# Patient Record
Sex: MALE | Race: WHITE | NOT HISPANIC OR LATINO | Employment: UNEMPLOYED | URBAN - METROPOLITAN AREA
[De-identification: names, ages, dates, MRNs, and addresses within clinical notes are randomized per-mention and may not be internally consistent; named-entity substitution may affect disease eponyms.]

---

## 2019-07-04 ENCOUNTER — HOSPITAL ENCOUNTER (INPATIENT)
Facility: HOSPITAL | Age: 64
LOS: 1 days | Discharge: HOME HEALTH | DRG: 190 | End: 2019-07-05
Attending: EMERGENCY MEDICINE | Admitting: HOSPITALIST
Payer: MEDICARE

## 2019-07-04 DIAGNOSIS — J44.1 CHRONIC OBSTRUCTIVE PULMONARY DISEASE WITH ACUTE EXACERBATION: Primary | ICD-10-CM

## 2019-07-04 DIAGNOSIS — R06.02 SOB (SHORTNESS OF BREATH): ICD-10-CM

## 2019-07-04 PROBLEM — M25.551 PAIN OF RIGHT HIP JOINT: Status: ACTIVE | Noted: 2019-07-04

## 2019-07-04 PROBLEM — J96.21 ACUTE ON CHRONIC RESPIRATORY FAILURE WITH HYPOXIA AND HYPERCAPNIA: Status: ACTIVE | Noted: 2019-07-04

## 2019-07-04 PROBLEM — Z86.79 HISTORY OF BACTERIAL ENDOCARDITIS: Status: ACTIVE | Noted: 2019-07-04

## 2019-07-04 PROBLEM — I10 ESSENTIAL HYPERTENSION: Status: ACTIVE | Noted: 2019-07-04

## 2019-07-04 PROBLEM — E43 SEVERE PROTEIN-CALORIE MALNUTRITION: Status: ACTIVE | Noted: 2019-07-04

## 2019-07-04 PROBLEM — J96.22 ACUTE ON CHRONIC RESPIRATORY FAILURE WITH HYPOXIA AND HYPERCAPNIA: Status: ACTIVE | Noted: 2019-07-04

## 2019-07-04 PROBLEM — D72.829 LEUKOCYTOSIS: Status: ACTIVE | Noted: 2019-07-04

## 2019-07-04 PROBLEM — Z85.46 HISTORY OF PROSTATE CANCER: Status: ACTIVE | Noted: 2019-07-04

## 2019-07-04 LAB
ALBUMIN SERPL BCP-MCNC: 3.8 G/DL (ref 3.5–5.2)
ALLENS TEST: ABNORMAL
ALLENS TEST: ABNORMAL
ALP SERPL-CCNC: 54 U/L (ref 55–135)
ALT SERPL W/O P-5'-P-CCNC: 35 U/L (ref 10–44)
AMPHET+METHAMPHET UR QL: NEGATIVE
ANION GAP SERPL CALC-SCNC: 17 MMOL/L (ref 8–16)
AST SERPL-CCNC: 28 U/L (ref 10–40)
BARBITURATES UR QL SCN>200 NG/ML: NEGATIVE
BASOPHILS # BLD AUTO: 0.07 K/UL (ref 0–0.2)
BASOPHILS NFR BLD: 0.5 % (ref 0–1.9)
BENZODIAZ UR QL SCN>200 NG/ML: NEGATIVE
BILIRUB SERPL-MCNC: 0.4 MG/DL (ref 0.1–1)
BNP SERPL-MCNC: 41 PG/ML (ref 0–99)
BUN SERPL-MCNC: 21 MG/DL (ref 8–23)
BZE UR QL SCN: NORMAL
CALCIUM SERPL-MCNC: 8 MG/DL (ref 8.7–10.5)
CANNABINOIDS UR QL SCN: NEGATIVE
CHLORIDE SERPL-SCNC: 102 MMOL/L (ref 95–110)
CO2 SERPL-SCNC: 27 MMOL/L (ref 23–29)
CREAT SERPL-MCNC: 1.2 MG/DL (ref 0.5–1.4)
CREAT UR-MCNC: 91 MG/DL (ref 23–375)
DELSYS: ABNORMAL
DELSYS: ABNORMAL
DIFFERENTIAL METHOD: ABNORMAL
EOSINOPHIL # BLD AUTO: 0.3 K/UL (ref 0–0.5)
EOSINOPHIL NFR BLD: 2.6 % (ref 0–8)
EP: 5
ERYTHROCYTE [DISTWIDTH] IN BLOOD BY AUTOMATED COUNT: 13.2 % (ref 11.5–14.5)
ERYTHROCYTE [SEDIMENTATION RATE] IN BLOOD BY WESTERGREN METHOD: 20 MM/H
EST. GFR  (AFRICAN AMERICAN): >60 ML/MIN/1.73 M^2
EST. GFR  (NON AFRICAN AMERICAN): >60 ML/MIN/1.73 M^2
ETHANOL UR-MCNC: <10 MG/DL
FIO2: 40
GLUCOSE SERPL-MCNC: 94 MG/DL (ref 70–110)
HCO3 UR-SCNC: 31 MMOL/L (ref 24–28)
HCO3 UR-SCNC: 39 MMOL/L (ref 24–28)
HCT VFR BLD AUTO: 46.5 % (ref 40–54)
HGB BLD-MCNC: 15 G/DL (ref 14–18)
IMM GRANULOCYTES # BLD AUTO: 0.09 K/UL (ref 0–0.04)
IMM GRANULOCYTES NFR BLD AUTO: 0.7 % (ref 0–0.5)
IP: 12
LACTATE SERPL-SCNC: 1.2 MMOL/L (ref 0.5–2.2)
LYMPHOCYTES # BLD AUTO: 3 K/UL (ref 1–4.8)
LYMPHOCYTES NFR BLD: 22.6 % (ref 18–48)
MAGNESIUM SERPL-MCNC: 1.7 MG/DL (ref 1.6–2.6)
MCH RBC QN AUTO: 30.8 PG (ref 27–31)
MCHC RBC AUTO-ENTMCNC: 32.3 G/DL (ref 32–36)
MCV RBC AUTO: 96 FL (ref 82–98)
METHADONE UR QL SCN>300 NG/ML: NEGATIVE
MIN VOL: 9.7
MODE: ABNORMAL
MODE: ABNORMAL
MONOCYTES # BLD AUTO: 1.3 K/UL (ref 0.3–1)
MONOCYTES NFR BLD: 9.7 % (ref 4–15)
NEUTROPHILS # BLD AUTO: 8.5 K/UL (ref 1.8–7.7)
NEUTROPHILS NFR BLD: 63.9 % (ref 38–73)
NRBC BLD-RTO: 0 /100 WBC
OPIATES UR QL SCN: NEGATIVE
PCO2 BLDA: 60.4 MMHG (ref 35–45)
PCO2 BLDA: 87.3 MMHG (ref 35–45)
PCP UR QL SCN>25 NG/ML: NEGATIVE
PH SMN: 7.26 [PH] (ref 7.35–7.45)
PH SMN: 7.32 [PH] (ref 7.35–7.45)
PLATELET # BLD AUTO: 193 K/UL (ref 150–350)
PMV BLD AUTO: 9.9 FL (ref 9.2–12.9)
PO2 BLDA: 167 MMHG (ref 80–100)
PO2 BLDA: 19 MMHG (ref 40–60)
POC BE: 12 MMOL/L
POC BE: 5 MMOL/L
POC SATURATED O2: 20 % (ref 95–100)
POC SATURATED O2: 99 % (ref 95–100)
POC TCO2: 33 MMOL/L (ref 23–27)
POC TCO2: 42 MMOL/L (ref 24–29)
POTASSIUM SERPL-SCNC: 4 MMOL/L (ref 3.5–5.1)
PROCALCITONIN SERPL IA-MCNC: 0.06 NG/ML
PROT SERPL-MCNC: 5.6 G/DL (ref 6–8.4)
RBC # BLD AUTO: 4.87 M/UL (ref 4.6–6.2)
SAMPLE: ABNORMAL
SAMPLE: ABNORMAL
SITE: ABNORMAL
SITE: ABNORMAL
SODIUM SERPL-SCNC: 146 MMOL/L (ref 136–145)
SP02: 99
SPONT RATE: 9
TOXICOLOGY INFORMATION: NORMAL
TROPONIN I SERPL DL<=0.01 NG/ML-MCNC: 0.02 NG/ML (ref 0–0.03)
WBC # BLD AUTO: 13.24 K/UL (ref 3.9–12.7)

## 2019-07-04 PROCEDURE — 80053 COMPREHEN METABOLIC PANEL: CPT

## 2019-07-04 PROCEDURE — 99900035 HC TECH TIME PER 15 MIN (STAT)

## 2019-07-04 PROCEDURE — 83605 ASSAY OF LACTIC ACID: CPT

## 2019-07-04 PROCEDURE — 12000002 HC ACUTE/MED SURGE SEMI-PRIVATE ROOM

## 2019-07-04 PROCEDURE — 99285 EMERGENCY DEPT VISIT HI MDM: CPT | Mod: ,,, | Performed by: EMERGENCY MEDICINE

## 2019-07-04 PROCEDURE — 85025 COMPLETE CBC W/AUTO DIFF WBC: CPT

## 2019-07-04 PROCEDURE — 96360 HYDRATION IV INFUSION INIT: CPT

## 2019-07-04 PROCEDURE — 93005 ELECTROCARDIOGRAM TRACING: CPT

## 2019-07-04 PROCEDURE — 36600 WITHDRAWAL OF ARTERIAL BLOOD: CPT

## 2019-07-04 PROCEDURE — 94660 CPAP INITIATION&MGMT: CPT

## 2019-07-04 PROCEDURE — 27000221 HC OXYGEN, UP TO 24 HOURS

## 2019-07-04 PROCEDURE — 82803 BLOOD GASES ANY COMBINATION: CPT

## 2019-07-04 PROCEDURE — 84484 ASSAY OF TROPONIN QUANT: CPT

## 2019-07-04 PROCEDURE — 25000242 PHARM REV CODE 250 ALT 637 W/ HCPCS: Performed by: EMERGENCY MEDICINE

## 2019-07-04 PROCEDURE — 87040 BLOOD CULTURE FOR BACTERIA: CPT

## 2019-07-04 PROCEDURE — 99223 1ST HOSP IP/OBS HIGH 75: CPT | Mod: ,,, | Performed by: INTERNAL MEDICINE

## 2019-07-04 PROCEDURE — 84145 PROCALCITONIN (PCT): CPT

## 2019-07-04 PROCEDURE — 83735 ASSAY OF MAGNESIUM: CPT

## 2019-07-04 PROCEDURE — 94640 AIRWAY INHALATION TREATMENT: CPT

## 2019-07-04 PROCEDURE — 94761 N-INVAS EAR/PLS OXIMETRY MLT: CPT

## 2019-07-04 PROCEDURE — 99223 PR INITIAL HOSPITAL CARE,LEVL III: ICD-10-PCS | Mod: ,,, | Performed by: INTERNAL MEDICINE

## 2019-07-04 PROCEDURE — 83880 ASSAY OF NATRIURETIC PEPTIDE: CPT

## 2019-07-04 PROCEDURE — 93010 ELECTROCARDIOGRAM REPORT: CPT | Mod: ,,, | Performed by: INTERNAL MEDICINE

## 2019-07-04 PROCEDURE — 80307 DRUG TEST PRSMV CHEM ANLYZR: CPT

## 2019-07-04 PROCEDURE — 11000001 HC ACUTE MED/SURG PRIVATE ROOM

## 2019-07-04 PROCEDURE — 99285 PR EMERGENCY DEPT VISIT,LEVEL V: ICD-10-PCS | Mod: ,,, | Performed by: EMERGENCY MEDICINE

## 2019-07-04 PROCEDURE — 96361 HYDRATE IV INFUSION ADD-ON: CPT

## 2019-07-04 PROCEDURE — 99285 EMERGENCY DEPT VISIT HI MDM: CPT | Mod: 25

## 2019-07-04 PROCEDURE — 93010 EKG 12-LEAD: ICD-10-PCS | Mod: ,,, | Performed by: INTERNAL MEDICINE

## 2019-07-04 PROCEDURE — 27000190 HC CPAP FULL FACE MASK W/VALVE

## 2019-07-04 PROCEDURE — 25000003 PHARM REV CODE 250: Performed by: STUDENT IN AN ORGANIZED HEALTH CARE EDUCATION/TRAINING PROGRAM

## 2019-07-04 RX ORDER — ENOXAPARIN SODIUM 100 MG/ML
40 INJECTION SUBCUTANEOUS EVERY 24 HOURS
Status: DISCONTINUED | OUTPATIENT
Start: 2019-07-04 | End: 2019-07-05 | Stop reason: HOSPADM

## 2019-07-04 RX ORDER — ALBUTEROL SULFATE 2.5 MG/.5ML
5 SOLUTION RESPIRATORY (INHALATION)
Status: COMPLETED | OUTPATIENT
Start: 2019-07-04 | End: 2019-07-04

## 2019-07-04 RX ORDER — ONDANSETRON 8 MG/1
8 TABLET, ORALLY DISINTEGRATING ORAL EVERY 8 HOURS PRN
Status: DISCONTINUED | OUTPATIENT
Start: 2019-07-04 | End: 2019-07-05 | Stop reason: HOSPADM

## 2019-07-04 RX ORDER — PREDNISONE 20 MG/1
40 TABLET ORAL DAILY
Status: DISCONTINUED | OUTPATIENT
Start: 2019-07-05 | End: 2019-07-04

## 2019-07-04 RX ORDER — PREDNISONE 20 MG/1
40 TABLET ORAL DAILY
Status: DISCONTINUED | OUTPATIENT
Start: 2019-07-05 | End: 2019-07-05 | Stop reason: HOSPADM

## 2019-07-04 RX ORDER — AZITHROMYCIN 250 MG/1
500 TABLET, FILM COATED ORAL ONCE
Status: DISCONTINUED | OUTPATIENT
Start: 2019-07-04 | End: 2019-07-04

## 2019-07-04 RX ORDER — TAMSULOSIN HYDROCHLORIDE 0.4 MG/1
0.4 CAPSULE ORAL DAILY
Status: DISCONTINUED | OUTPATIENT
Start: 2019-07-05 | End: 2019-07-05 | Stop reason: HOSPADM

## 2019-07-04 RX ORDER — AZITHROMYCIN 250 MG/1
500 TABLET, FILM COATED ORAL ONCE
Status: COMPLETED | OUTPATIENT
Start: 2019-07-04 | End: 2019-07-05

## 2019-07-04 RX ORDER — IPRATROPIUM BROMIDE AND ALBUTEROL SULFATE 2.5; .5 MG/3ML; MG/3ML
3 SOLUTION RESPIRATORY (INHALATION)
Status: DISCONTINUED | OUTPATIENT
Start: 2019-07-05 | End: 2019-07-05 | Stop reason: HOSPADM

## 2019-07-04 RX ORDER — SODIUM CHLORIDE 0.9 % (FLUSH) 0.9 %
10 SYRINGE (ML) INJECTION
Status: DISCONTINUED | OUTPATIENT
Start: 2019-07-04 | End: 2019-07-05 | Stop reason: HOSPADM

## 2019-07-04 RX ORDER — AZITHROMYCIN 250 MG/1
250 TABLET, FILM COATED ORAL DAILY
Status: DISCONTINUED | OUTPATIENT
Start: 2019-07-05 | End: 2019-07-04

## 2019-07-04 RX ORDER — IBUPROFEN 200 MG
1 TABLET ORAL DAILY
Status: DISCONTINUED | OUTPATIENT
Start: 2019-07-05 | End: 2019-07-05 | Stop reason: HOSPADM

## 2019-07-04 RX ORDER — IPRATROPIUM BROMIDE AND ALBUTEROL SULFATE 2.5; .5 MG/3ML; MG/3ML
3 SOLUTION RESPIRATORY (INHALATION)
Status: COMPLETED | OUTPATIENT
Start: 2019-07-04 | End: 2019-07-04

## 2019-07-04 RX ORDER — ALBUTEROL SULFATE 90 UG/1
2 AEROSOL, METERED RESPIRATORY (INHALATION) EVERY 6 HOURS PRN
Status: DISCONTINUED | OUTPATIENT
Start: 2019-07-04 | End: 2019-07-05 | Stop reason: HOSPADM

## 2019-07-04 RX ORDER — ACETAMINOPHEN 325 MG/1
650 TABLET ORAL EVERY 6 HOURS PRN
Status: DISCONTINUED | OUTPATIENT
Start: 2019-07-04 | End: 2019-07-05 | Stop reason: HOSPADM

## 2019-07-04 RX ORDER — AZITHROMYCIN 250 MG/1
250 TABLET, FILM COATED ORAL DAILY
Status: DISCONTINUED | OUTPATIENT
Start: 2019-07-05 | End: 2019-07-05 | Stop reason: HOSPADM

## 2019-07-04 RX ORDER — FLUTICASONE FUROATE AND VILANTEROL 100; 25 UG/1; UG/1
1 POWDER RESPIRATORY (INHALATION) DAILY
Status: DISCONTINUED | OUTPATIENT
Start: 2019-07-05 | End: 2019-07-05 | Stop reason: HOSPADM

## 2019-07-04 RX ADMIN — IPRATROPIUM BROMIDE AND ALBUTEROL SULFATE 3 ML: .5; 3 SOLUTION RESPIRATORY (INHALATION) at 07:07

## 2019-07-04 RX ADMIN — ALBUTEROL SULFATE 5 MG: 2.5 SOLUTION RESPIRATORY (INHALATION) at 07:07

## 2019-07-04 RX ADMIN — SODIUM CHLORIDE 1000 ML: 0.9 INJECTION, SOLUTION INTRAVENOUS at 07:07

## 2019-07-05 VITALS
HEIGHT: 61 IN | DIASTOLIC BLOOD PRESSURE: 69 MMHG | OXYGEN SATURATION: 100 % | TEMPERATURE: 96 F | WEIGHT: 107.13 LBS | RESPIRATION RATE: 18 BRPM | BODY MASS INDEX: 20.22 KG/M2 | SYSTOLIC BLOOD PRESSURE: 93 MMHG | HEART RATE: 81 BPM

## 2019-07-05 LAB
ANION GAP SERPL CALC-SCNC: 12 MMOL/L (ref 8–16)
ANISOCYTOSIS BLD QL SMEAR: SLIGHT
BASOPHILS # BLD AUTO: 0.02 K/UL (ref 0–0.2)
BASOPHILS NFR BLD: 0.2 % (ref 0–1.9)
BUN SERPL-MCNC: 24 MG/DL (ref 8–23)
CALCIUM SERPL-MCNC: 9.1 MG/DL (ref 8.7–10.5)
CHLORIDE SERPL-SCNC: 103 MMOL/L (ref 95–110)
CO2 SERPL-SCNC: 21 MMOL/L (ref 23–29)
COMPLEXED PSA SERPL-MCNC: 0.54 NG/ML (ref 0–4)
CREAT SERPL-MCNC: 0.8 MG/DL (ref 0.5–1.4)
DIFFERENTIAL METHOD: ABNORMAL
EOSINOPHIL # BLD AUTO: 0 K/UL (ref 0–0.5)
EOSINOPHIL NFR BLD: 0.1 % (ref 0–8)
ERYTHROCYTE [DISTWIDTH] IN BLOOD BY AUTOMATED COUNT: 13.2 % (ref 11.5–14.5)
EST. GFR  (AFRICAN AMERICAN): >60 ML/MIN/1.73 M^2
EST. GFR  (NON AFRICAN AMERICAN): >60 ML/MIN/1.73 M^2
GLUCOSE SERPL-MCNC: 171 MG/DL (ref 70–110)
HCT VFR BLD AUTO: 41.8 % (ref 40–54)
HGB BLD-MCNC: 13.1 G/DL (ref 14–18)
IMM GRANULOCYTES # BLD AUTO: 0.07 K/UL (ref 0–0.04)
IMM GRANULOCYTES NFR BLD AUTO: 0.7 % (ref 0–0.5)
LYMPHOCYTES # BLD AUTO: 0.7 K/UL (ref 1–4.8)
LYMPHOCYTES NFR BLD: 6.8 % (ref 18–48)
MAGNESIUM SERPL-MCNC: 1.9 MG/DL (ref 1.6–2.6)
MCH RBC QN AUTO: 30.8 PG (ref 27–31)
MCHC RBC AUTO-ENTMCNC: 31.3 G/DL (ref 32–36)
MCV RBC AUTO: 98 FL (ref 82–98)
MONOCYTES # BLD AUTO: 0.1 K/UL (ref 0.3–1)
MONOCYTES NFR BLD: 0.9 % (ref 4–15)
NEUTROPHILS # BLD AUTO: 8.9 K/UL (ref 1.8–7.7)
NEUTROPHILS NFR BLD: 91.3 % (ref 38–73)
NRBC BLD-RTO: 0 /100 WBC
PLATELET # BLD AUTO: 126 K/UL (ref 150–350)
PLATELET BLD QL SMEAR: ABNORMAL
PMV BLD AUTO: 11.9 FL (ref 9.2–12.9)
POTASSIUM SERPL-SCNC: 4.9 MMOL/L (ref 3.5–5.1)
RBC # BLD AUTO: 4.26 M/UL (ref 4.6–6.2)
SODIUM SERPL-SCNC: 136 MMOL/L (ref 136–145)
WBC # BLD AUTO: 9.8 K/UL (ref 3.9–12.7)

## 2019-07-05 PROCEDURE — 27000221 HC OXYGEN, UP TO 24 HOURS

## 2019-07-05 PROCEDURE — 84153 ASSAY OF PSA TOTAL: CPT

## 2019-07-05 PROCEDURE — 83735 ASSAY OF MAGNESIUM: CPT

## 2019-07-05 PROCEDURE — 63600175 PHARM REV CODE 636 W HCPCS: Performed by: INTERNAL MEDICINE

## 2019-07-05 PROCEDURE — 25000242 PHARM REV CODE 250 ALT 637 W/ HCPCS: Performed by: INTERNAL MEDICINE

## 2019-07-05 PROCEDURE — 99239 HOSP IP/OBS DSCHRG MGMT >30: CPT | Mod: ,,, | Performed by: HOSPITALIST

## 2019-07-05 PROCEDURE — 87070 CULTURE OTHR SPECIMN AEROBIC: CPT

## 2019-07-05 PROCEDURE — 80048 BASIC METABOLIC PNL TOTAL CA: CPT

## 2019-07-05 PROCEDURE — 25000003 PHARM REV CODE 250: Performed by: INTERNAL MEDICINE

## 2019-07-05 PROCEDURE — 94640 AIRWAY INHALATION TREATMENT: CPT

## 2019-07-05 PROCEDURE — 99900035 HC TECH TIME PER 15 MIN (STAT)

## 2019-07-05 PROCEDURE — S4991 NICOTINE PATCH NONLEGEND: HCPCS | Performed by: INTERNAL MEDICINE

## 2019-07-05 PROCEDURE — 36415 COLL VENOUS BLD VENIPUNCTURE: CPT

## 2019-07-05 PROCEDURE — 94761 N-INVAS EAR/PLS OXIMETRY MLT: CPT

## 2019-07-05 PROCEDURE — 85025 COMPLETE CBC W/AUTO DIFF WBC: CPT

## 2019-07-05 PROCEDURE — 87205 SMEAR GRAM STAIN: CPT

## 2019-07-05 PROCEDURE — 99239 PR HOSPITAL DISCHARGE DAY,>30 MIN: ICD-10-PCS | Mod: ,,, | Performed by: HOSPITALIST

## 2019-07-05 RX ORDER — AZITHROMYCIN 250 MG/1
250 TABLET, FILM COATED ORAL DAILY
Qty: 4 TABLET | Refills: 0 | Status: ON HOLD | OUTPATIENT
Start: 2019-07-06 | End: 2022-01-31 | Stop reason: HOSPADM

## 2019-07-05 RX ORDER — IBUPROFEN 200 MG
1 TABLET ORAL DAILY
Refills: 0 | COMMUNITY
Start: 2019-07-06 | End: 2019-07-05

## 2019-07-05 RX ORDER — PREDNISONE 5 MG/1
40 TABLET ORAL DAILY
Qty: 32 TABLET | Refills: 0 | Status: SHIPPED | OUTPATIENT
Start: 2019-07-06 | End: 2019-07-05

## 2019-07-05 RX ORDER — FLUTICASONE FUROATE AND VILANTEROL 100; 25 UG/1; UG/1
1 POWDER RESPIRATORY (INHALATION) DAILY
Qty: 60 EACH | Refills: 11 | Status: SHIPPED | OUTPATIENT
Start: 2019-07-06

## 2019-07-05 RX ORDER — TIOTROPIUM BROMIDE 18 UG/1
18 CAPSULE ORAL; RESPIRATORY (INHALATION) DAILY
Qty: 30 CAPSULE | Refills: 6 | Status: SHIPPED | OUTPATIENT
Start: 2019-07-05 | End: 2019-08-04

## 2019-07-05 RX ORDER — IBUPROFEN 200 MG
1 TABLET ORAL DAILY
Qty: 28 PATCH | Refills: 2 | Status: SHIPPED | OUTPATIENT
Start: 2019-07-06

## 2019-07-05 RX ORDER — PREDNISONE 5 MG/1
40 TABLET ORAL DAILY
Qty: 32 TABLET | Refills: 0 | Status: SHIPPED | OUTPATIENT
Start: 2019-07-06 | End: 2019-07-10

## 2019-07-05 RX ORDER — FLUTICASONE FUROATE AND VILANTEROL 100; 25 UG/1; UG/1
1 POWDER RESPIRATORY (INHALATION) DAILY
Qty: 60 EACH | Refills: 11 | Status: SHIPPED | OUTPATIENT
Start: 2019-07-06 | End: 2019-07-05

## 2019-07-05 RX ORDER — TIOTROPIUM BROMIDE 18 UG/1
18 CAPSULE ORAL; RESPIRATORY (INHALATION) DAILY
Qty: 30 CAPSULE | Refills: 6 | Status: SHIPPED | OUTPATIENT
Start: 2019-07-05 | End: 2019-07-05 | Stop reason: SDUPTHER

## 2019-07-05 RX ORDER — AZITHROMYCIN 250 MG/1
250 TABLET, FILM COATED ORAL DAILY
Qty: 4 TABLET | Refills: 0 | Status: SHIPPED | OUTPATIENT
Start: 2019-07-06 | End: 2019-07-05

## 2019-07-05 RX ADMIN — IPRATROPIUM BROMIDE AND ALBUTEROL SULFATE 3 ML: .5; 3 SOLUTION RESPIRATORY (INHALATION) at 01:07

## 2019-07-05 RX ADMIN — Medication 1 PATCH: at 10:07

## 2019-07-05 RX ADMIN — ALBUTEROL SULFATE 2 PUFF: 90 AEROSOL, METERED RESPIRATORY (INHALATION) at 10:07

## 2019-07-05 RX ADMIN — AZITHROMYCIN 500 MG: 250 TABLET, FILM COATED ORAL at 01:07

## 2019-07-05 RX ADMIN — TAMSULOSIN HYDROCHLORIDE 0.4 MG: 0.4 CAPSULE ORAL at 10:07

## 2019-07-05 RX ADMIN — PREDNISONE 40 MG: 20 TABLET ORAL at 10:07

## 2019-07-05 RX ADMIN — AZITHROMYCIN 250 MG: 250 TABLET, FILM COATED ORAL at 10:07

## 2019-07-05 RX ADMIN — ENOXAPARIN SODIUM 40 MG: 100 INJECTION SUBCUTANEOUS at 01:07
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
## 2019-07-05 NOTE — HOSPITAL COURSE
"Patient was admitted on IM R for further management of severe COPD. Upon evaluation on HD 1, patient stated "I want to go home now. I have things to do, that can't wait. I feel much better, I need to go home."    Patient's respiratory status on examination appeared to be his baseline. He was oxygenating in the high 90s on his baseline home supplemental O2 requirement. His lungs were clear to auscultation without wheezing or rales noted. He was speaking in full sentences and joking about having to feed his fat, black cat. We discussed my concerns that his severe COPD was approaching end stage, particularly after he described his severe limitations at home ("I can't shower every day because of the shortness of breath", "my coffee is cold by the time I'm able to sit down with it to enjoy it"), but he made it clear that he wished to still manage his affairs on his own at home. I discussed alf placement with him and he stated that he had been considering it, but "I'm not ready yet". When he insisted on discharge today, I expressed concern that he would likely be looking forward to multiple readmissions for further exacerbations and he stated, "no doubt". However, given his clinical stability and return to his functional baseline, as well as having capacity to make his own decisions, he was discharged upon request, with recommendations to follow up with his PCP within 1 week. His Rx for Breo, Combivent and Spiriva along with a steroid taper and total 5 day course of abx were sent to [facility] pharmacy for bedside delivery. Home health orders were placed and SW/CM were notified of patient's need for oxygen upon transfer home.   "

## 2019-07-05 NOTE — PLAN OF CARE
"[facility]    HOME HEALTH ORDERS  FACE TO FACE ENCOUNTER    Patient Name: Dashawn Zuleta  YOB: 1955    PCP: Klaus Childress MD   PCP Address: [address] [place] [address]  PCP Phone Number: [phone]  PCP Fax: [phone]    Encounter Date: 07/05/2019    Admit to Home Health    Diagnoses:  Active Hospital Problems    Diagnosis  POA    *Acute on chronic respiratory failure with hypoxia and hypercapnia [J96.21, J96.22]  Yes    Chronic obstructive pulmonary disease with acute exacerbation [J44.1]  Yes    Essential hypertension [I10]  Yes    History of bacterial endocarditis [Z86.79]  Not Applicable    Leukocytosis [D72.829]  Yes    Pain of right hip joint [M25.551]  Yes    Severe protein-calorie malnutrition [E43]  Yes    History of prostate cancer [Z85.46]  Not Applicable    Hepatitis C, chronic [B18.2]  Yes     Chronic    Tobacco abuse [Z72.0]  Yes    History of intravenous drug abuse [Z87.898]  Yes    Benign prostatic hyperplasia without lower urinary tract symptoms [N40.0]  Yes      Resolved Hospital Problems   No resolved problems to display.       No future appointments.        I have seen and examined this patient face to face today. My clinical findings that support the need for the home health skilled services and home bound status are the following:  Weakness/numbness causing balance and gait disturbance due to COPD Exacerbation making it taxing to leave home.    Allergies:  Review of patient's allergies indicates:   Allergen Reactions    Percocet [oxycodone-acetaminophen] Other (See Comments)     Pt states this drug gives him "bad dreams", he does not want this prescribed       Diet: regular diet    Activities: activity as tolerated    Nursing:   SN to complete comprehensive assessment including routine vital signs. Instruct on disease process and s/s of complications to report to MD. Review/verify medication list sent home with the patient at " time of discharge  and instruct patient/caregiver as needed. Frequency may be adjusted depending on start of care date.    Notify MD if SBP > 160 or < 90; DBP > 90 or < 50; HR > 120 or < 50; Temp > 101; Other:         CONSULTS:    Physical Therapy to evaluate and treat. Evaluate for home safety and equipment needs; Establish/upgrade home exercise program. Perform / instruct on therapeutic exercises, gait training, transfer training, and Range of Motion.  Occupational Therapy to evaluate and treat. Evaluate home environment for safety and equipment needs. Perform/Instruct on transfers, ADL training, ROM, and therapeutic exercises.   to evaluate for community resources/long-range planning.  Aide to provide assistance with personal care, ADLs, and vital signs.    MISCELLANEOUS CARE:  COPD: Teach patient MDI/HFA usage and guidelines  Please provide smoking education, cessation, and nicotine replacement options if patient is a current smoker or if anyone in the household is a smoker  Please ensure that patient has a pulse oximeter and is educated on his normal oxygen saturations: 88-92%  Please ensure patient has a functioning nebulizer and provide education on its usage.  If patient has increased cough or symptoms, please initiate COPD protocol including  schedule appointment with PCP or pulmonologist within 24 hours    Continue Home Oxygen 4L to maintain O2 sat >88%    WOUND CARE ORDERS  n/a      Medications: Review discharge medications with patient and family and provide education.      Current Discharge Medication List      START taking these medications    Details   azithromycin (Z-AIDAN) 250 MG tablet Take 1 tablet (250 mg total) by mouth once daily.  Qty: 4 tablet, Refills: 0      fluticasone furoate-vilanterol (BREO) 100-25 mcg/dose diskus inhaler Inhale 1 puff into the lungs once daily. Controller  Qty: 1 each, Refills: 11      nicotine (NICODERM CQ) 14 mg/24 hr Place 1 patch onto the skin once  daily.  Refills: 0      predniSONE (DELTASONE) 20 MG tablet Take 2 tablets (40 mg total) by mouth once daily. for 4 days  Qty: 8 tablet, Refills: 0         CONTINUE these medications which have NOT CHANGED    Details   albuterol (PROAIR HFA) 90 mcg/actuation HFAA Inhale 2 puffs into the lungs every 6 (six) hours as needed.      albuterol-ipratropium  mcg (COMBIVENT)  mcg/actuation inhaler Inhale 2 puffs into the lungs every 4 (four) hours as needed for Wheezing or Shortness of Breath.  Qty: 14.7 g, Refills: 1      tamsulosin (FLOMAX) 0.4 mg Cp24 Take 1 capsule (0.4 mg total) by mouth 2 (two) times daily with meals.  Qty: 60 capsule, Refills: 11         STOP taking these medications       oxybutynin (DITROPAN) 5 MG Tab Comments:   Reason for Stopping:               I certify that this patient is confined to his home and needs intermittent skilled nursing care, physical therapy and occupational therapy.

## 2019-07-05 NOTE — NURSING
Pt arrived to room 660 via stretcher. AAO X4. Denies pain and SOB at rest. C/O SOB upon exertion. Urinal provided. Request Condom cath, ordered. VSS. Resp remain elevated. Placed on O2 @ 4 L/ NC. Safety in place. Advised to call for assistance before getting out of bed, verbalized understanding. Will monitor.

## 2019-07-05 NOTE — HPI
"Per admitting provider:     Mr. Zuleta is a 64-year-old man with prostate cancer s/p laser therapy, chronic HCV, HTN, hx IVDU c/b IE further complicated by MR, and chronic hypoxic and hypercapnic respiratory failure on 4L home O2 secondary to advanced COPD who presents with shortness of breath.     He is somewhat of a poor historian due to poor insight into his medical conditions, but he describes a recent 4 day history at [facility] for COPD vs. pneumonia. They "did nothing," and he was discharged on his usual COPD medications, almost immediately becoming dyspneic, worst in the last two days. His condition has worsened to the extent that he can barely move without severe dyspnea, which also limits his ability to eat. No chest pain, palpitations, syncope, or edema. He has a chronic cough productive of yellow phlegm, worst after albuterol use, which is unchanged. He has been trying to cut down on his smoking, now down to about 3 cigarettes per day, but smoked more yesterday, feeling worse after each cigarette. His only other complaints are a "mass" in his right leg (points to greater trochanter process) on recent nasal congestion. He is unsure of the details regarding his home COPD regimen, but has been using his albuterol neb at least 4 times daily at home and is out of his fluticasone inhaler.     On arrival to the ED he was in respiratory distress, breathing > 40 times a minute on 4L with . He was stabilized with duonebs, steroids, and an hour of BiPAP. His work of breathing improved significantly, along with respiratory acidosis from 7.258 -> 7.318. Admission requested for COPD exacerbation  "
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
## 2019-07-05 NOTE — PLAN OF CARE
Problem: Adult Inpatient Plan of Care  Goal: Plan of Care Review  Outcome: Ongoing (interventions implemented as appropriate)     07/05/19 0510   Plan of Care Review   Plan of Care Reviewed With patient     Pt remain free from fall and injuries. Tolerate meds well. VSS. NO SOB noted. Denies pain and discomfort. Placed on condom cath per request. Advised to call for assist as needed and wait for assist to arrive, verbalized understanding. Safety maintained. Will monitor.

## 2019-07-05 NOTE — ED NOTES
Telemetry Verification   Patient placed on Telemetry Box  Verified with War Room  Box # [id number]   Monitor Tech cristian   Rate 88   Rhythm NSR

## 2019-07-05 NOTE — CONSULTS
"  [facility]  Adult Nutrition  Consult Note    SUMMARY     Recommendations    Recommendation:   1. Continue low Na diet   2. Encourage good PO intake and diet compliance   RD to monitor and follow up     Goals: pt to meet >75% of EEN/EPN while admitted  Nutrition Goal Status: new  Communication of RD Recs: other (comment)(POC)    Reason for Assessment    Reason For Assessment: consult  Diagnosis: (SOB)  Relevant Medical History: SOB; COPD: HepC; PVD; prostate cancer  Interdisciplinary Rounds: did not attend  General Information Comments: Pt with % of meals today. States at home he is unable to cook for himself due to SOB, discussed easy meals and adequate intake with pt. Pt with poor understanding of Low Na diet. Wt loss reported over past year of ~20 lbs. Reported  lbs. Pt with severe malnutrition at this time. Encouraging intake and diet compliance.   Nutrition Discharge Planning: d/c on low Na diet    Nutrition Risk Screen    Nutrition Risk Screen: no indicators present    Nutrition/Diet History    Patient Reported Diet/Restrictions/Preferences: general  Spiritual, Cultural Beliefs, Methodist Practices, Values that Affect Care: no  Food Allergies: NKFA  Factors Affecting Nutritional Intake: None identified at this time    Anthropometrics    Temp: 96.3 °F (35.7 °C)  Height: 5' 0.5" (153.7 cm)  Height (inches): 60.5 in  Weight Method: Bed Scale  Weight: 48.6 kg (107 lb 2.3 oz)  Weight (lb): 107.14 lb  Ideal Body Weight (IBW), Male: 109 lb  % Ideal Body Weight, Male (lb): 93.04 lb  BMI (Calculated): 19.5  BMI Grade: 18.5-24.9 - normal  Weight Loss: unintentional  Usual Body Weight (UBW), k.82 kg  % Usual Body Weight: 85.71       Lab/Procedures/Meds    Pertinent Labs Reviewed: reviewed  Pertinent Labs Comments: BUN 24; Glucose 171  Pertinent Medications Reviewed: reviewed  Pertinent Medications Comments: prednisone; tamsulosin; enoxaparin     Estimated/Assessed Needs    Weight " Used For Calorie Calculations: 48.6 kg (107 lb 2.3 oz)  Energy Calorie Requirements (kcal): 2847-8856 kcal/day   Energy Need Method: Kcal/kg  Protein Requirements: 48-63 g/day (1.0-1.3 g/kg)   Weight Used For Protein Calculations: 48.6 kg (107 lb 2.3 oz)  Fluid Requirements (mL): 1 mL/kcal or per MD  RDA Method (mL): 1215    Nutrition Prescription Ordered    Current Diet Order: Low Na diet    Evaluation of Received Nutrient/Fluid Intake    Energy Calories Required: meeting needs  Protein Required: meeting needs  Fluid Required: meeting needs  Tolerance: tolerating  % Intake of Estimated Energy Needs: 75 - 100 %  % Meal Intake: 75 - 100 %    Nutrition Risk    Level of Risk/Frequency of Follow-up: low(1x/week)     Assessment and Plan    Severe malnutrition in the context of Social/Environmental Circumstances    Related to (etiology):  Decreased intake 2/2 SOB    Signs and Symptoms (as evidenced by):  Energy Intake: <75% of estimated energy requirement for 1 month   Body Fat Depletion: moderate depletion of triceps   Muscle Mass Depletion: moderate and severe depletion of temples, clavicle region, scapular region and interosseous muscle   Weight Loss: 14% x 1 year      Interventions  (treatment strategy):  Collaboration with other providers     Nutrition Diagnosis Status:  New    Monitor and Evaluation    Food and Nutrient Intake: energy intake, food and beverage intake  Food and Nutrient Adminstration: diet order  Knowledge/Beliefs/Attitudes: food and nutrition knowledge/skill  Anthropometric Measurements: weight, weight change  Biochemical Data, Medical Tests and Procedures: electrolyte and renal panel, lipid profile, gastrointestinal profile, glucose/endocrine profile, inflammatory profile  Nutrition-Focused Physical Findings: overall appearance     Malnutrition Assessment    Orbital Region (Subcutaneous Fat Loss): moderate depletion  Upper Arm Region (Subcutaneous Fat Loss): moderate depletion   Restoration Region  (Muscle Loss): moderate depletion  Clavicle Bone Region (Muscle Loss): severe depletion  Clavicle and Acromion Bone Region (Muscle Loss): severe depletion  Scapular Bone Region (Muscle Loss): moderate depletion  Dorsal Hand (Muscle Loss): moderate depletion   Edema (Fluid Accumulation): 0-->no edema present   Subcutaneous Fat Loss (Final Summary): moderate protein-calorie malnutrition  Muscle Loss Evaluation (Final Summary): moderate protein-calorie malnutrition      Nutrition Follow-Up    RD Follow-up?: Yes

## 2019-07-05 NOTE — DISCHARGE SUMMARY
"[facility] Hospital Medicine  Discharge Summary      Patient Name: Dashawn Zuleta  MRN: [id number]  Admission Date: 7/4/2019  Hospital Length of Stay: 1 days  Discharge Date and Time: No discharge date for patient encounter.  Attending Physician: Sue Gonzales*   Discharging Provider: Sue Gonzales MD  Primary Care Provider: Klaus Childress MD  Hospital Medicine Team: [facility] HOSP MED  Sue Gonzales MD    HPI:   Per admitting provider:     Mr. Zuleta is a 64-year-old man with prostate cancer s/p laser therapy, chronic HCV, HTN, hx IVDU c/b IE further complicated by MR, and chronic hypoxic and hypercapnic respiratory failure on 4L home O2 secondary to advanced COPD who presents with shortness of breath.     He is somewhat of a poor historian due to poor insight into his medical conditions, but he describes a recent 4 day history at [facility] for COPD vs. pneumonia. They "did nothing," and he was discharged on his usual COPD medications, almost immediately becoming dyspneic, worst in the last two days. His condition has worsened to the extent that he can barely move without severe dyspnea, which also limits his ability to eat. No chest pain, palpitations, syncope, or edema. He has a chronic cough productive of yellow phlegm, worst after albuterol use, which is unchanged. He has been trying to cut down on his smoking, now down to about 3 cigarettes per day, but smoked more yesterday, feeling worse after each cigarette. His only other complaints are a "mass" in his right leg (points to greater trochanter process) on recent nasal congestion. He is unsure of the details regarding his home COPD regimen, but has been using his albuterol neb at least 4 times daily at home and is out of his fluticasone inhaler.     On arrival to the ED he was in respiratory distress, breathing > 40 times a minute on 4L with . He was stabilized with duonebs, steroids, and an hour of BiPAP. His " "work of breathing improved significantly, along with respiratory acidosis from 7.258 -> 7.318. Admission requested for COPD exacerbation    * No surgery found *      Hospital Course:   Patient was admitted on IM R for further management of severe COPD. Upon evaluation on HD 1, patient stated "I want to go home now. I have things to do, that can't wait. I feel much better, I need to go home."    Patient's respiratory status on examination appeared to be his baseline. He was oxygenating in the high 90s on his baseline home supplemental O2 requirement. His lungs were clear to auscultation without wheezing or rales noted. He was speaking in full sentences and joking about having to feed his fat, black cat. We discussed my concerns that his severe COPD was approaching end stage, particularly after he described his severe limitations at home ("I can't shower every day because of the shortness of breath", "my coffee is cold by the time I'm able to sit down with it to enjoy it"), but he made it clear that he wished to still manage his affairs on his own at home. I discussed [facility] placement with him and he stated that he had been considering it, but "I'm not ready yet". When he insisted on discharge today, I expressed concern that he would likely be looking forward to multiple readmissions for further exacerbations and he stated, "no doubt". However, given his clinical stability and return to his functional baseline, as well as having capacity to make his own decisions, he was discharged upon request, with recommendations to follow up with his PCP within 1 week. His Rx for Breo, Combivent and Spiriva along with a steroid taper and total 5 day course of abx were sent to [facility] pharmacy for bedside delivery. Home health orders were placed and SW/CM were notified of patient's need for oxygen upon transfer home.      Consults:   Consults (From admission, onward)        Status Ordering Provider     Inpatient consult to " Registered Dietitian/Nutritionist  Once     Provider:  (Not yet assigned)    Acknowledged ISABEL VANEGAS          No new Assessment & Plan notes have been filed under this hospital service since the last note was generated.  Service: Hospital Medicine    Final Active Diagnoses:    Diagnosis Date Noted POA    PRINCIPAL PROBLEM:  Acute on chronic respiratory failure with hypoxia and hypercapnia [J96.21, J96.22] 07/04/2019 Yes    Chronic obstructive pulmonary disease with acute exacerbation [J44.1] 07/04/2019 Yes    Essential hypertension [I10] 07/04/2019 Yes    History of bacterial endocarditis [Z86.79] 07/04/2019 Not Applicable    Leukocytosis [D72.829] 07/04/2019 Yes    Pain of right hip joint [M25.551] 07/04/2019 Yes    Severe protein-calorie malnutrition [E43] 07/04/2019 Yes    History of prostate cancer [Z85.46] 07/04/2019 Not Applicable    Hepatitis C, chronic [B18.2] 08/09/2013 Yes     Chronic    Tobacco abuse [Z72.0] 08/09/2013 Yes    History of intravenous drug abuse [Z87.898]  Yes    Benign prostatic hyperplasia without lower urinary tract symptoms [N40.0] 10/11/2012 Yes      Problems Resolved During this Admission:       Discharged Condition: stable    Disposition: Home or Self Care    Follow Up:    Patient Instructions:   No discharge procedures on file.    Significant Diagnostic Studies: Labs:   CMP   Recent Labs   Lab 07/04/19 1942 07/05/19 0431   * 136   K 4.0 4.9    103   CO2 27 21*   GLU 94 171*   BUN 21 24*   CREATININE 1.2 0.8   CALCIUM 8.0* 9.1   PROT 5.6*  --    ALBUMIN 3.8  --    BILITOT 0.4  --    ALKPHOS 54*  --    AST 28  --    ALT 35  --    ANIONGAP 17* 12   ESTGFRAFRICA >60.0 >60.0   EGFRNONAA >60.0 >60.0    and CBC   Recent Labs   Lab 07/04/19 1942 07/05/19 0431   WBC 13.24* 9.80   HGB 15.0 13.1*   HCT 46.5 41.8    126*       Pending Diagnostic Studies:     None         Medications:  Reconciled Home Medications:      Medication List      START taking  these medications    azithromycin 250 MG tablet  Commonly known as:  Z-AIDAN  Take 1 tablet (250 mg total) by mouth once daily.  Start taking on:  7/6/2019     fluticasone furoate-vilanterol 100-25 mcg/dose diskus inhaler  Commonly known as:  BREO  Inhale 1 puff into the lungs once daily. Controller  Start taking on:  7/6/2019     ipratropium-albuterol  mcg/actuation inhaler  Commonly known as:  COMBIVENT  Inhale 1 puff into the lungs every 6 (six) hours as needed for Wheezing. Rescue  Replaces:  albuterol-ipratropium  mcg  mcg/actuation inhaler     nicotine 14 mg/24 hr  Commonly known as:  NICODERM CQ  Place 1 patch onto the skin once daily.  Start taking on:  7/6/2019     predniSONE 20 MG tablet  Commonly known as:  DELTASONE  Take 2 tablets (40 mg total) by mouth once daily. for 4 days  Start taking on:  7/6/2019     tiotropium 18 mcg inhalation capsule  Commonly known as:  SPIRIVA  Inhale 1 capsule (18 mcg total) into the lungs once daily.        CONTINUE taking these medications    PROAIR HFA 90 mcg/actuation inhaler  Generic drug:  albuterol  Inhale 2 puffs into the lungs every 6 (six) hours as needed.     tamsulosin 0.4 mg Cap  Commonly known as:  FLOMAX  Take 1 capsule (0.4 mg total) by mouth 2 (two) times daily with meals.        STOP taking these medications    albuterol-ipratropium  mcg  mcg/actuation inhaler  Commonly known as:  COMBIVENT  Replaced by:  ipratropium-albuterol  mcg/actuation inhaler     oxybutynin 5 MG Tab  Commonly known as:  DITROPAN            Indwelling Lines/Drains at time of discharge:   Lines/Drains/Airways          None          Time spent on the discharge of patient: 35 minutes  Patient was seen and examined on the date of discharge and determined to be suitable for discharge.         Sue Gonzales MD  Department of Hospital Medicine  [facility]

## 2019-07-05 NOTE — PLAN OF CARE
07/05/19 1446   Discharge Assessment   Assessment Type Discharge Planning Assessment   Confirmed/corrected address and phone number on facesheet? Yes   Assessment information obtained from? Patient   Expected Length of Stay (days) 1   Communicated expected length of stay with patient/caregiver yes   Prior to hospitilization cognitive status: Alert/Oriented   Prior to hospitalization functional status: Assistive Equipment   Current cognitive status: Alert/Oriented   Current Functional Status: Assistive Equipment   Lives With facility resident   Able to Return to Prior Arrangements yes   Is patient able to care for self after discharge? Yes   Readmission Within the Last 30 Days no previous admission in last 30 days   Patient currently being followed by outpatient case management? No   Patient currently receives any other outside agency services? No   Equipment Currently Used at Home oxygen   Do you have any problems affording any of your prescribed medications? No   Is the patient taking medications as prescribed? yes   Does the patient have transportation home? Yes   Transportation Anticipated health plan transportation   Does the patient receive services at the [facility]? No   Discharge Plan A Home   Discharge Plan B Home   DME Needed Upon Discharge  none   Patient/Family in Agreement with Plan yes

## 2019-07-05 NOTE — NURSING
"DR gonzales notified patient developed nose bleed . Sn instructed patient to put pressure on nose bleed . Dr Gonzales stated, " holds pressure if does not stop bleeding before he leaves call me back .   "

## 2019-07-05 NOTE — ED TRIAGE NOTES
64 year old male with history of COPD presents to the ED via EMS c/o shortness of breath. On arrival patient with labored breathing. IV started and given 125mg Solu Medrol. CPAP initiated and given Duo Neb.

## 2019-07-06 NOTE — NURSING
Pt discharged to home via wheelchair via transport co. With Oxygen per nasal cannula Pt given prescriptions and copy of discharged papers instructions. Pt denies pain at this time. Pt educated on signs and symptoms of when to call the doctor.Patient instructed to follow with MD appt.  All belongings with the patient . Pt verbalized understanding.

## 2019-07-08 LAB
BACTERIA SPEC AEROBE CULT: NORMAL
BACTERIA SPEC AEROBE CULT: NORMAL
GRAM STN SPEC: NORMAL

## 2019-07-08 NOTE — PHYSICIAN QUERY
PT Name: Dashawn Zuleta  MR #: [id number]     Physician Query Form - Medication-Correlation for Diagnosis      CDS/: James Fortune RN               Contact information:    [email or web address]    This form is a permanent document in the medical record.     Query Date: July 8, 2019      By submitting this query, we are merely seeking further clarification of documentation.  Please utilize your independent clinical judgment when addressing the question(s) below.    The medical record contains the following:  The patient has an order for the following medication(s):         Azithromycin-- indications of Use: Upper respiratory infection per MAR on 7/5 @0142 and 1020        Please provide the corresponding diagnosis or diagnoses that support(s) the use of the medication(s)     Azithromycin    Provider Use Only    _____  Pneumonia is current to this encounter    __x___  Other/ specify: __COPD exacerbation_______________________      [ [   ] ] Clinically Undetermined

## 2019-07-09 LAB
BACTERIA BLD CULT: NORMAL
BACTERIA BLD CULT: NORMAL

## 2021-03-11 ENCOUNTER — EXTERNAL HOME HEALTH (OUTPATIENT)
Dept: HOME HEALTH SERVICES | Facility: HOSPITAL | Age: 66
End: 2021-03-11

## 2021-04-16 ENCOUNTER — PATIENT MESSAGE (OUTPATIENT)
Dept: RESEARCH | Facility: HOSPITAL | Age: 66
End: 2021-04-16

## 2022-01-30 ENCOUNTER — HOSPITAL ENCOUNTER (OUTPATIENT)
Facility: HOSPITAL | Age: 67
Discharge: HOME | End: 2022-01-31
Attending: EMERGENCY MEDICINE | Admitting: EMERGENCY MEDICINE
Payer: MEDICARE

## 2022-01-30 DIAGNOSIS — R07.9 CHEST PAIN: ICD-10-CM

## 2022-01-30 DIAGNOSIS — S22.41XA CLOSED FRACTURE OF MULTIPLE RIBS OF RIGHT SIDE, INITIAL ENCOUNTER: Primary | ICD-10-CM

## 2022-01-30 LAB
ALBUMIN SERPL BCP-MCNC: 3.2 G/DL (ref 3.5–5.2)
ALP SERPL-CCNC: 76 U/L (ref 55–135)
ALT SERPL W/O P-5'-P-CCNC: 12 U/L (ref 10–44)
ANION GAP SERPL CALC-SCNC: 4 MMOL/L (ref 8–16)
AST SERPL-CCNC: 16 U/L (ref 10–40)
BASOPHILS # BLD AUTO: 0.03 K/UL (ref 0–0.2)
BASOPHILS NFR BLD: 0.2 % (ref 0–1.9)
BILIRUB SERPL-MCNC: 0.8 MG/DL (ref 0.1–1)
BUN SERPL-MCNC: 19 MG/DL (ref 8–23)
CALCIUM SERPL-MCNC: 9.5 MG/DL (ref 8.7–10.5)
CHLORIDE SERPL-SCNC: 103 MMOL/L (ref 95–110)
CO2 SERPL-SCNC: 33 MMOL/L (ref 23–29)
CREAT SERPL-MCNC: 0.8 MG/DL (ref 0.5–1.4)
CTP QC/QA: YES
DIFFERENTIAL METHOD: ABNORMAL
EOSINOPHIL # BLD AUTO: 0.2 K/UL (ref 0–0.5)
EOSINOPHIL NFR BLD: 1.8 % (ref 0–8)
ERYTHROCYTE [DISTWIDTH] IN BLOOD BY AUTOMATED COUNT: 13.2 % (ref 11.5–14.5)
EST. GFR  (AFRICAN AMERICAN): >60 ML/MIN/1.73 M^2
EST. GFR  (NON AFRICAN AMERICAN): >60 ML/MIN/1.73 M^2
GLUCOSE SERPL-MCNC: 109 MG/DL (ref 70–110)
HCT VFR BLD AUTO: 40.2 % (ref 40–54)
HGB BLD-MCNC: 12.4 G/DL (ref 14–18)
IMM GRANULOCYTES # BLD AUTO: 0.05 K/UL (ref 0–0.04)
IMM GRANULOCYTES NFR BLD AUTO: 0.4 % (ref 0–0.5)
LYMPHOCYTES # BLD AUTO: 1.5 K/UL (ref 1–4.8)
LYMPHOCYTES NFR BLD: 12.7 % (ref 18–48)
MCH RBC QN AUTO: 29 PG (ref 27–31)
MCHC RBC AUTO-ENTMCNC: 30.8 G/DL (ref 32–36)
MCV RBC AUTO: 94 FL (ref 82–98)
MONOCYTES # BLD AUTO: 1.6 K/UL (ref 0.3–1)
MONOCYTES NFR BLD: 12.9 % (ref 4–15)
NEUTROPHILS # BLD AUTO: 8.6 K/UL (ref 1.8–7.7)
NEUTROPHILS NFR BLD: 72 % (ref 38–73)
NRBC BLD-RTO: 0 /100 WBC
PLATELET # BLD AUTO: 233 K/UL (ref 150–450)
PMV BLD AUTO: 11 FL (ref 9.2–12.9)
POTASSIUM SERPL-SCNC: 3.9 MMOL/L (ref 3.5–5.1)
PROT SERPL-MCNC: 6.2 G/DL (ref 6–8.4)
RBC # BLD AUTO: 4.27 M/UL (ref 4.6–6.2)
SARS-COV-2 RDRP RESP QL NAA+PROBE: NEGATIVE
SODIUM SERPL-SCNC: 140 MMOL/L (ref 136–145)
TROPONIN I SERPL DL<=0.01 NG/ML-MCNC: 0.01 NG/ML (ref 0–0.03)
WBC # BLD AUTO: 12.01 K/UL (ref 3.9–12.7)

## 2022-01-30 PROCEDURE — 25000242 PHARM REV CODE 250 ALT 637 W/ HCPCS: Performed by: INTERNAL MEDICINE

## 2022-01-30 PROCEDURE — 94640 AIRWAY INHALATION TREATMENT: CPT

## 2022-01-30 PROCEDURE — 25000003 PHARM REV CODE 250: Performed by: EMERGENCY MEDICINE

## 2022-01-30 PROCEDURE — 63600175 PHARM REV CODE 636 W HCPCS: Performed by: INTERNAL MEDICINE

## 2022-01-30 PROCEDURE — 99285 EMERGENCY DEPT VISIT HI MDM: CPT | Mod: 25

## 2022-01-30 PROCEDURE — 99900035 HC TECH TIME PER 15 MIN (STAT)

## 2022-01-30 PROCEDURE — U0002 COVID-19 LAB TEST NON-CDC: HCPCS | Performed by: EMERGENCY MEDICINE

## 2022-01-30 PROCEDURE — 25000003 PHARM REV CODE 250: Performed by: INTERNAL MEDICINE

## 2022-01-30 PROCEDURE — 99284 EMERGENCY DEPT VISIT MOD MDM: CPT | Mod: ,,, | Performed by: EMERGENCY MEDICINE

## 2022-01-30 PROCEDURE — 25000242 PHARM REV CODE 250 ALT 637 W/ HCPCS: Performed by: EMERGENCY MEDICINE

## 2022-01-30 PROCEDURE — 94761 N-INVAS EAR/PLS OXIMETRY MLT: CPT

## 2022-01-30 PROCEDURE — 96372 THER/PROPH/DIAG INJ SC/IM: CPT | Performed by: INTERNAL MEDICINE

## 2022-01-30 PROCEDURE — 93010 ELECTROCARDIOGRAM REPORT: CPT | Mod: ,,, | Performed by: INTERNAL MEDICINE

## 2022-01-30 PROCEDURE — G0378 HOSPITAL OBSERVATION PER HR: HCPCS

## 2022-01-30 PROCEDURE — 80053 COMPREHEN METABOLIC PANEL: CPT | Performed by: EMERGENCY MEDICINE

## 2022-01-30 PROCEDURE — 93010 EKG 12-LEAD: ICD-10-PCS | Mod: ,,, | Performed by: INTERNAL MEDICINE

## 2022-01-30 PROCEDURE — 93005 ELECTROCARDIOGRAM TRACING: CPT

## 2022-01-30 PROCEDURE — 27000221 HC OXYGEN, UP TO 24 HOURS

## 2022-01-30 PROCEDURE — 36000 PLACE NEEDLE IN VEIN: CPT

## 2022-01-30 PROCEDURE — 84484 ASSAY OF TROPONIN QUANT: CPT | Performed by: EMERGENCY MEDICINE

## 2022-01-30 PROCEDURE — 99284 PR EMERGENCY DEPT VISIT,LEVEL IV: ICD-10-PCS | Mod: ,,, | Performed by: EMERGENCY MEDICINE

## 2022-01-30 PROCEDURE — 85025 COMPLETE CBC W/AUTO DIFF WBC: CPT | Performed by: EMERGENCY MEDICINE

## 2022-01-30 RX ORDER — IBUPROFEN 200 MG
24 TABLET ORAL
Status: DISCONTINUED | OUTPATIENT
Start: 2022-01-30 | End: 2022-01-31 | Stop reason: HOSPADM

## 2022-01-30 RX ORDER — SODIUM CHLORIDE FOR INHALATION 3 %
4 VIAL, NEBULIZER (ML) INHALATION ONCE
Status: DISCONTINUED | OUTPATIENT
Start: 2022-01-30 | End: 2022-01-30

## 2022-01-30 RX ORDER — IBUPROFEN 200 MG
16 TABLET ORAL
Status: DISCONTINUED | OUTPATIENT
Start: 2022-01-30 | End: 2022-01-31 | Stop reason: HOSPADM

## 2022-01-30 RX ORDER — ENOXAPARIN SODIUM 100 MG/ML
30 INJECTION SUBCUTANEOUS EVERY 24 HOURS
Status: DISCONTINUED | OUTPATIENT
Start: 2022-01-30 | End: 2022-01-31 | Stop reason: HOSPADM

## 2022-01-30 RX ORDER — IBUPROFEN 400 MG/1
400 TABLET ORAL
Status: COMPLETED | OUTPATIENT
Start: 2022-01-30 | End: 2022-01-30

## 2022-01-30 RX ORDER — IPRATROPIUM BROMIDE AND ALBUTEROL SULFATE 2.5; .5 MG/3ML; MG/3ML
3 SOLUTION RESPIRATORY (INHALATION) EVERY 6 HOURS
Status: DISCONTINUED | OUTPATIENT
Start: 2022-01-30 | End: 2022-01-31 | Stop reason: HOSPADM

## 2022-01-30 RX ORDER — TALC
6 POWDER (GRAM) TOPICAL NIGHTLY PRN
Status: DISCONTINUED | OUTPATIENT
Start: 2022-01-30 | End: 2022-01-31 | Stop reason: HOSPADM

## 2022-01-30 RX ORDER — SODIUM CHLORIDE FOR INHALATION 3 %
4 VIAL, NEBULIZER (ML) INHALATION ONCE
Status: COMPLETED | OUTPATIENT
Start: 2022-01-30 | End: 2022-01-30

## 2022-01-30 RX ORDER — NALOXONE HCL 0.4 MG/ML
0.02 VIAL (ML) INJECTION
Status: DISCONTINUED | OUTPATIENT
Start: 2022-01-30 | End: 2022-01-31 | Stop reason: HOSPADM

## 2022-01-30 RX ORDER — GLUCAGON 1 MG
1 KIT INJECTION
Status: DISCONTINUED | OUTPATIENT
Start: 2022-01-30 | End: 2022-01-31 | Stop reason: HOSPADM

## 2022-01-30 RX ORDER — FLUTICASONE FUROATE AND VILANTEROL 100; 25 UG/1; UG/1
1 POWDER RESPIRATORY (INHALATION) DAILY
Status: DISCONTINUED | OUTPATIENT
Start: 2022-01-31 | End: 2022-01-31 | Stop reason: HOSPADM

## 2022-01-30 RX ORDER — SODIUM CHLORIDE 0.9 % (FLUSH) 0.9 %
10 SYRINGE (ML) INJECTION
Status: DISCONTINUED | OUTPATIENT
Start: 2022-01-30 | End: 2022-01-31 | Stop reason: HOSPADM

## 2022-01-30 RX ORDER — IPRATROPIUM BROMIDE AND ALBUTEROL SULFATE 2.5; .5 MG/3ML; MG/3ML
3 SOLUTION RESPIRATORY (INHALATION)
Status: COMPLETED | OUTPATIENT
Start: 2022-01-30 | End: 2022-01-30

## 2022-01-30 RX ORDER — TRAMADOL HYDROCHLORIDE 50 MG/1
50 TABLET ORAL EVERY 6 HOURS PRN
Status: DISCONTINUED | OUTPATIENT
Start: 2022-01-30 | End: 2022-01-31 | Stop reason: HOSPADM

## 2022-01-30 RX ORDER — LIDOCAINE 50 MG/G
1 PATCH TOPICAL
Status: DISCONTINUED | OUTPATIENT
Start: 2022-01-30 | End: 2022-01-31 | Stop reason: HOSPADM

## 2022-01-30 RX ORDER — ACETAMINOPHEN 500 MG
1000 TABLET ORAL EVERY 8 HOURS
Status: DISCONTINUED | OUTPATIENT
Start: 2022-01-30 | End: 2022-01-31 | Stop reason: HOSPADM

## 2022-01-30 RX ORDER — SODIUM CHLORIDE 0.9 % (FLUSH) 0.9 %
10 SYRINGE (ML) INJECTION EVERY 12 HOURS PRN
Status: DISCONTINUED | OUTPATIENT
Start: 2022-01-30 | End: 2022-01-31 | Stop reason: HOSPADM

## 2022-01-30 RX ADMIN — SODIUM CHLORIDE SOLN NEBU 3% 4 ML: 3 NEBU SOLN at 08:01

## 2022-01-30 RX ADMIN — IBUPROFEN 400 MG: 400 TABLET, FILM COATED ORAL at 04:01

## 2022-01-30 RX ADMIN — ACETAMINOPHEN 1000 MG: 500 TABLET ORAL at 09:01

## 2022-01-30 RX ADMIN — LIDOCAINE 5% 1 PATCH: 700 PATCH TOPICAL at 12:01

## 2022-01-30 RX ADMIN — IPRATROPIUM BROMIDE AND ALBUTEROL SULFATE 3 ML: .5; 2.5 SOLUTION RESPIRATORY (INHALATION) at 12:01

## 2022-01-30 RX ADMIN — IPRATROPIUM BROMIDE AND ALBUTEROL SULFATE 3 ML: 2.5; .5 SOLUTION RESPIRATORY (INHALATION) at 10:01
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
## 2022-01-31 VITALS
OXYGEN SATURATION: 96 % | SYSTOLIC BLOOD PRESSURE: 114 MMHG | TEMPERATURE: 99 F | BODY MASS INDEX: 21.25 KG/M2 | DIASTOLIC BLOOD PRESSURE: 63 MMHG | HEART RATE: 58 BPM | WEIGHT: 108.25 LBS | HEIGHT: 60 IN | RESPIRATION RATE: 18 BRPM

## 2022-01-31 PROCEDURE — 25000003 PHARM REV CODE 250: Performed by: INTERNAL MEDICINE

## 2022-01-31 PROCEDURE — 99217 PR OBSERVATION CARE DISCHARGE: CPT | Mod: ,,, | Performed by: INTERNAL MEDICINE

## 2022-01-31 PROCEDURE — G0378 HOSPITAL OBSERVATION PER HR: HCPCS

## 2022-01-31 PROCEDURE — 27000221 HC OXYGEN, UP TO 24 HOURS

## 2022-01-31 PROCEDURE — 94640 AIRWAY INHALATION TREATMENT: CPT

## 2022-01-31 PROCEDURE — 99217 PR OBSERVATION CARE DISCHARGE: ICD-10-PCS | Mod: ,,, | Performed by: INTERNAL MEDICINE

## 2022-01-31 PROCEDURE — 25000242 PHARM REV CODE 250 ALT 637 W/ HCPCS: Performed by: INTERNAL MEDICINE

## 2022-01-31 PROCEDURE — 94761 N-INVAS EAR/PLS OXIMETRY MLT: CPT

## 2022-01-31 RX ORDER — TRAMADOL HYDROCHLORIDE AND ACETAMINOPHEN 37.5; 325 MG/1; MG/1
2 TABLET, FILM COATED ORAL EVERY 6 HOURS PRN
Qty: 100 TABLET | Refills: 0 | Status: SHIPPED | OUTPATIENT
Start: 2022-01-31 | End: 2022-02-14

## 2022-01-31 RX ORDER — LIDOCAINE 50 MG/G
1 PATCH TOPICAL DAILY
Qty: 30 PATCH | Refills: 0 | Status: ON HOLD | OUTPATIENT
Start: 2022-01-31 | End: 2022-10-22 | Stop reason: HOSPADM

## 2022-01-31 RX ADMIN — ACETAMINOPHEN 1000 MG: 500 TABLET ORAL at 05:01

## 2022-01-31 RX ADMIN — TRAMADOL HYDROCHLORIDE 50 MG: 50 TABLET, COATED ORAL at 09:01

## 2022-01-31 RX ADMIN — ACETAMINOPHEN 1000 MG: 500 TABLET ORAL at 01:01

## 2022-01-31 RX ADMIN — FLUTICASONE FUROATE AND VILANTEROL TRIFENATATE 1 PUFF: 100; 25 POWDER RESPIRATORY (INHALATION) at 09:01

## 2022-01-31 RX ADMIN — TRAMADOL HYDROCHLORIDE 50 MG: 50 TABLET, COATED ORAL at 01:01

## 2022-01-31 RX ADMIN — IPRATROPIUM BROMIDE AND ALBUTEROL SULFATE 3 ML: 2.5; .5 SOLUTION RESPIRATORY (INHALATION) at 01:01

## 2022-01-31 RX ADMIN — LIDOCAINE 5% 1 PATCH: 700 PATCH TOPICAL at 01:01

## 2022-01-31 RX ADMIN — IPRATROPIUM BROMIDE AND ALBUTEROL SULFATE 3 ML: 2.5; .5 SOLUTION RESPIRATORY (INHALATION) at 09:01
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
## 2022-01-31 NOTE — PLAN OF CARE
CM arranged ambulance transport via [facility]. Requested  time is  1700.  Requested  time does not guarantee arrival time.  If transport does not arrive by 1800 please contact assigned SW or on-call for assistance.  Philip Chisholm RN,BSN

## 2022-01-31 NOTE — PLAN OF CARE
Pt is AAOx4. 5L NC.   Pt c/o urinary retention, bladder scanned at 24cc. [facility] notified no new orders at this time.   No falls or new injuries reported during shift, safety precautions maintained.    Problem: Adult Inpatient Plan of Care  Goal: Plan of Care Review  Outcome: Ongoing, Progressing     Problem: Adult Inpatient Plan of Care  Goal: Optimal Comfort and Wellbeing  Outcome: Ongoing, Progressing

## 2022-02-01 NOTE — PLAN OF CARE
[facility] - Telemetry Stepdown ([facility]  Discharge Final Note    Primary Care Provider: No primary care provider on file.     Pt discharged home with no services.  Philip Chisholm, RN,BSN        Expected Discharge Date: 1/31/2022    Final Discharge Note (most recent)     Final Note - 02/01/22 0838        Final Note    Assessment Type Final Discharge Note     Anticipated Discharge Disposition Home or Self Care     Hospital Resources/Appts/Education Provided Provided patient/caregiver with written discharge plan information;Appointments scheduled and added to AVS        Post-Acute Status    Discharge Delays None known at this time                 Important Message from Medicare

## 2022-02-01 NOTE — NURSING
Pt dc per MD orders. Dc and prescription instructions given. Pt verbalizes understanding. PIV removed catheter tip intact. Vss. Pt transported by [facility] ambulance via stretcher. Pt on 5l O2 Nc.

## 2022-02-01 NOTE — PLAN OF CARE
Pt aao x 3. C/o R sided chest pain . PRN pain med given with moderate relief. VSS. Safety precautions maintained. Call light within reach. Pt free of falls.
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
## 2022-02-20 ENCOUNTER — HOSPITAL ENCOUNTER (EMERGENCY)
Facility: HOSPITAL | Age: 67
Discharge: HOME | End: 2022-02-21
Attending: EMERGENCY MEDICINE
Payer: MEDICARE

## 2022-02-20 DIAGNOSIS — J44.1 COPD EXACERBATION: Primary | ICD-10-CM

## 2022-02-20 DIAGNOSIS — R53.81 DEBILITY: ICD-10-CM

## 2022-02-20 DIAGNOSIS — R06.02 SHORTNESS OF BREATH: ICD-10-CM

## 2022-02-20 DIAGNOSIS — R07.89 CHEST WALL PAIN: ICD-10-CM

## 2022-02-20 LAB
ALBUMIN SERPL BCP-MCNC: 3.9 G/DL (ref 3.5–5.2)
ALP SERPL-CCNC: 116 U/L (ref 55–135)
ALT SERPL W/O P-5'-P-CCNC: 29 U/L (ref 10–44)
ANION GAP SERPL CALC-SCNC: 12 MMOL/L (ref 8–16)
AST SERPL-CCNC: 31 U/L (ref 10–40)
BASOPHILS # BLD AUTO: 0.02 K/UL (ref 0–0.2)
BASOPHILS NFR BLD: 0.1 % (ref 0–1.9)
BILIRUB SERPL-MCNC: 0.3 MG/DL (ref 0.1–1)
BNP SERPL-MCNC: 113 PG/ML (ref 0–99)
BUN SERPL-MCNC: 27 MG/DL (ref 8–23)
CALCIUM SERPL-MCNC: 9.9 MG/DL (ref 8.7–10.5)
CHLORIDE SERPL-SCNC: 103 MMOL/L (ref 95–110)
CO2 SERPL-SCNC: 29 MMOL/L (ref 23–29)
CREAT SERPL-MCNC: 0.8 MG/DL (ref 0.5–1.4)
CTP QC/QA: YES
DIFFERENTIAL METHOD: ABNORMAL
EOSINOPHIL # BLD AUTO: 0 K/UL (ref 0–0.5)
EOSINOPHIL NFR BLD: 0 % (ref 0–8)
ERYTHROCYTE [DISTWIDTH] IN BLOOD BY AUTOMATED COUNT: 13.7 % (ref 11.5–14.5)
EST. GFR  (AFRICAN AMERICAN): >60 ML/MIN/1.73 M^2
EST. GFR  (NON AFRICAN AMERICAN): >60 ML/MIN/1.73 M^2
GLUCOSE SERPL-MCNC: 118 MG/DL (ref 70–110)
HCT VFR BLD AUTO: 42.7 % (ref 40–54)
HGB BLD-MCNC: 12.9 G/DL (ref 14–18)
IMM GRANULOCYTES # BLD AUTO: 0.08 K/UL (ref 0–0.04)
IMM GRANULOCYTES NFR BLD AUTO: 0.5 % (ref 0–0.5)
LYMPHOCYTES # BLD AUTO: 1.5 K/UL (ref 1–4.8)
LYMPHOCYTES NFR BLD: 9.3 % (ref 18–48)
MCH RBC QN AUTO: 29.3 PG (ref 27–31)
MCHC RBC AUTO-ENTMCNC: 30.2 G/DL (ref 32–36)
MCV RBC AUTO: 97 FL (ref 82–98)
MONOCYTES # BLD AUTO: 1.8 K/UL (ref 0.3–1)
MONOCYTES NFR BLD: 10.8 % (ref 4–15)
NEUTROPHILS # BLD AUTO: 13.2 K/UL (ref 1.8–7.7)
NEUTROPHILS NFR BLD: 79.3 % (ref 38–73)
NRBC BLD-RTO: 0 /100 WBC
PLATELET # BLD AUTO: 281 K/UL (ref 150–450)
PMV BLD AUTO: 10.6 FL (ref 9.2–12.9)
POTASSIUM SERPL-SCNC: 4.6 MMOL/L (ref 3.5–5.1)
PROT SERPL-MCNC: 7.5 G/DL (ref 6–8.4)
RBC # BLD AUTO: 4.41 M/UL (ref 4.6–6.2)
SARS-COV-2 RDRP RESP QL NAA+PROBE: NEGATIVE
SODIUM SERPL-SCNC: 144 MMOL/L (ref 136–145)
TROPONIN I SERPL DL<=0.01 NG/ML-MCNC: 0.02 NG/ML (ref 0–0.03)
WBC # BLD AUTO: 16.6 K/UL (ref 3.9–12.7)

## 2022-02-20 PROCEDURE — 99285 EMERGENCY DEPT VISIT HI MDM: CPT | Mod: 25

## 2022-02-20 PROCEDURE — 84484 ASSAY OF TROPONIN QUANT: CPT | Performed by: EMERGENCY MEDICINE

## 2022-02-20 PROCEDURE — 94761 N-INVAS EAR/PLS OXIMETRY MLT: CPT

## 2022-02-20 PROCEDURE — 93005 ELECTROCARDIOGRAM TRACING: CPT

## 2022-02-20 PROCEDURE — 96374 THER/PROPH/DIAG INJ IV PUSH: CPT

## 2022-02-20 PROCEDURE — U0002 COVID-19 LAB TEST NON-CDC: HCPCS | Performed by: EMERGENCY MEDICINE

## 2022-02-20 PROCEDURE — 85025 COMPLETE CBC W/AUTO DIFF WBC: CPT | Performed by: EMERGENCY MEDICINE

## 2022-02-20 PROCEDURE — 99284 PR EMERGENCY DEPT VISIT,LEVEL IV: ICD-10-PCS | Mod: CS,,, | Performed by: EMERGENCY MEDICINE

## 2022-02-20 PROCEDURE — 99284 EMERGENCY DEPT VISIT MOD MDM: CPT | Mod: CS,,, | Performed by: EMERGENCY MEDICINE

## 2022-02-20 PROCEDURE — 80053 COMPREHEN METABOLIC PANEL: CPT | Performed by: EMERGENCY MEDICINE

## 2022-02-20 PROCEDURE — 83880 ASSAY OF NATRIURETIC PEPTIDE: CPT | Performed by: EMERGENCY MEDICINE

## 2022-02-20 PROCEDURE — 93010 EKG 12-LEAD: ICD-10-PCS | Mod: ,,, | Performed by: INTERNAL MEDICINE

## 2022-02-20 PROCEDURE — 94640 AIRWAY INHALATION TREATMENT: CPT

## 2022-02-20 PROCEDURE — 93010 ELECTROCARDIOGRAM REPORT: CPT | Mod: ,,, | Performed by: INTERNAL MEDICINE

## 2022-02-20 RX ORDER — IPRATROPIUM BROMIDE AND ALBUTEROL SULFATE 2.5; .5 MG/3ML; MG/3ML
3 SOLUTION RESPIRATORY (INHALATION) ONCE
Status: COMPLETED | OUTPATIENT
Start: 2022-02-21 | End: 2022-02-20

## 2022-02-20 RX ORDER — METHYLPREDNISOLONE SOD SUCC 125 MG
125 VIAL (EA) INJECTION
Status: COMPLETED | OUTPATIENT
Start: 2022-02-20 | End: 2022-02-21

## 2022-02-20 RX ADMIN — IPRATROPIUM BROMIDE AND ALBUTEROL SULFATE 3 ML: 2.5; .5 SOLUTION RESPIRATORY (INHALATION) at 11:02

## 2022-02-21 VITALS
RESPIRATION RATE: 22 BRPM | WEIGHT: 108 LBS | OXYGEN SATURATION: 97 % | HEART RATE: 88 BPM | SYSTOLIC BLOOD PRESSURE: 110 MMHG | TEMPERATURE: 99 F | DIASTOLIC BLOOD PRESSURE: 59 MMHG | BODY MASS INDEX: 21.09 KG/M2

## 2022-02-21 PROCEDURE — 63600175 PHARM REV CODE 636 W HCPCS: Performed by: EMERGENCY MEDICINE

## 2022-02-21 PROCEDURE — 25000242 PHARM REV CODE 250 ALT 637 W/ HCPCS: Performed by: EMERGENCY MEDICINE

## 2022-02-21 RX ADMIN — METHYLPREDNISOLONE SODIUM SUCCINATE 125 MG: 125 INJECTION, POWDER, FOR SOLUTION INTRAMUSCULAR; INTRAVENOUS at 12:02
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
## 2022-08-19 ENCOUNTER — HOSPITAL ENCOUNTER (EMERGENCY)
Facility: OTHER | Age: 67
Discharge: HOME | End: 2022-08-20
Attending: EMERGENCY MEDICINE
Payer: MEDICARE

## 2022-08-19 VITALS
TEMPERATURE: 98 F | DIASTOLIC BLOOD PRESSURE: 73 MMHG | BODY MASS INDEX: 24.41 KG/M2 | RESPIRATION RATE: 14 BRPM | SYSTOLIC BLOOD PRESSURE: 130 MMHG | HEART RATE: 74 BPM | WEIGHT: 125 LBS | OXYGEN SATURATION: 96 %
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
## 2022-08-19 DIAGNOSIS — S62.001A CLOSED NONDISPLACED FRACTURE OF SCAPHOID OF RIGHT WRIST, UNSPECIFIED PORTION OF SCAPHOID, INITIAL ENCOUNTER: ICD-10-CM

## 2022-08-19 DIAGNOSIS — B35.1 TOENAIL FUNGUS: ICD-10-CM

## 2022-08-19 DIAGNOSIS — R06.02 SOB (SHORTNESS OF BREATH): ICD-10-CM

## 2022-08-19 DIAGNOSIS — J44.1 COPD EXACERBATION: Primary | ICD-10-CM

## 2022-08-19 LAB
BASOPHILS # BLD AUTO: 0.03 K/UL (ref 0–0.2)
BASOPHILS NFR BLD: 0.3 % (ref 0–1.9)
DIFFERENTIAL METHOD: ABNORMAL
EOSINOPHIL # BLD AUTO: 0 K/UL (ref 0–0.5)
EOSINOPHIL NFR BLD: 0.4 % (ref 0–8)
ERYTHROCYTE [DISTWIDTH] IN BLOOD BY AUTOMATED COUNT: 13.2 % (ref 11.5–14.5)
HCT VFR BLD AUTO: 34.5 % (ref 40–54)
HGB BLD-MCNC: 11 G/DL (ref 14–18)
IMM GRANULOCYTES # BLD AUTO: 0.04 K/UL (ref 0–0.04)
IMM GRANULOCYTES NFR BLD AUTO: 0.4 % (ref 0–0.5)
LYMPHOCYTES # BLD AUTO: 1.1 K/UL (ref 1–4.8)
LYMPHOCYTES NFR BLD: 12.3 % (ref 18–48)
MCH RBC QN AUTO: 29.5 PG (ref 27–31)
MCHC RBC AUTO-ENTMCNC: 31.9 G/DL (ref 32–36)
MCV RBC AUTO: 93 FL (ref 82–98)
MONOCYTES # BLD AUTO: 0.7 K/UL (ref 0.3–1)
MONOCYTES NFR BLD: 7.9 % (ref 4–15)
NEUTROPHILS # BLD AUTO: 7.1 K/UL (ref 1.8–7.7)
NEUTROPHILS NFR BLD: 78.7 % (ref 38–73)
NRBC BLD-RTO: 0 /100 WBC
PLATELET # BLD AUTO: 295 K/UL (ref 150–450)
PLATELET BLD QL SMEAR: ABNORMAL
PMV BLD AUTO: 10.7 FL (ref 9.2–12.9)
RBC # BLD AUTO: 3.73 M/UL (ref 4.6–6.2)
WBC # BLD AUTO: 9.07 K/UL (ref 3.9–12.7)

## 2022-08-19 PROCEDURE — 27000221 HC OXYGEN, UP TO 24 HOURS

## 2022-08-19 PROCEDURE — 85025 COMPLETE CBC W/AUTO DIFF WBC: CPT | Performed by: NURSE PRACTITIONER

## 2022-08-19 PROCEDURE — 93005 ELECTROCARDIOGRAM TRACING: CPT | Mod: 59

## 2022-08-19 PROCEDURE — 63600175 PHARM REV CODE 636 W HCPCS: Performed by: NURSE PRACTITIONER

## 2022-08-19 PROCEDURE — 93010 EKG 12-LEAD: ICD-10-PCS | Mod: ,,, | Performed by: INTERNAL MEDICINE

## 2022-08-19 PROCEDURE — 93010 ELECTROCARDIOGRAM REPORT: CPT | Mod: ,,, | Performed by: INTERNAL MEDICINE

## 2022-08-19 PROCEDURE — 99285 EMERGENCY DEPT VISIT HI MDM: CPT | Mod: 25

## 2022-08-19 PROCEDURE — 94761 N-INVAS EAR/PLS OXIMETRY MLT: CPT

## 2022-08-19 PROCEDURE — 25000242 PHARM REV CODE 250 ALT 637 W/ HCPCS: Performed by: NURSE PRACTITIONER

## 2022-08-19 PROCEDURE — 94640 AIRWAY INHALATION TREATMENT: CPT

## 2022-08-19 PROCEDURE — 29125 APPL SHORT ARM SPLINT STATIC: CPT | Mod: RT

## 2022-08-19 PROCEDURE — 96372 THER/PROPH/DIAG INJ SC/IM: CPT | Mod: 59 | Performed by: NURSE PRACTITIONER

## 2022-08-19 PROCEDURE — 25000242 PHARM REV CODE 250 ALT 637 W/ HCPCS: Performed by: PHYSICIAN ASSISTANT

## 2022-08-19 RX ORDER — IPRATROPIUM BROMIDE AND ALBUTEROL SULFATE 2.5; .5 MG/3ML; MG/3ML
3 SOLUTION RESPIRATORY (INHALATION)
Status: COMPLETED | OUTPATIENT
Start: 2022-08-19 | End: 2022-08-19

## 2022-08-19 RX ORDER — HYDROCODONE BITARTRATE AND ACETAMINOPHEN 5; 325 MG/1; MG/1
1 TABLET ORAL 4 TIMES DAILY
Qty: 12 TABLET | Refills: 0 | Status: SHIPPED | OUTPATIENT
Start: 2022-08-19 | End: 2022-08-22

## 2022-08-19 RX ORDER — ALBUTEROL SULFATE 2.5 MG/.5ML
2.5 SOLUTION RESPIRATORY (INHALATION)
Status: COMPLETED | OUTPATIENT
Start: 2022-08-19 | End: 2022-08-19

## 2022-08-19 RX ORDER — PREDNISONE 20 MG/1
40 TABLET ORAL DAILY
Qty: 10 TABLET | Refills: 0 | Status: SHIPPED | OUTPATIENT
Start: 2022-08-19 | End: 2022-08-24

## 2022-08-19 RX ORDER — METHYLPREDNISOLONE SOD SUCC 125 MG
125 VIAL (EA) INJECTION
Status: COMPLETED | OUTPATIENT
Start: 2022-08-19 | End: 2022-08-19

## 2022-08-19 RX ADMIN — ALBUTEROL SULFATE 2.5 MG: 2.5 SOLUTION RESPIRATORY (INHALATION) at 10:08

## 2022-08-19 RX ADMIN — METHYLPREDNISOLONE SODIUM SUCCINATE 125 MG: 125 INJECTION, POWDER, FOR SOLUTION INTRAMUSCULAR; INTRAVENOUS at 10:08

## 2022-08-19 RX ADMIN — IPRATROPIUM BROMIDE AND ALBUTEROL SULFATE 3 ML: .5; 3 SOLUTION RESPIRATORY (INHALATION) at 05:08

## 2022-08-19 RX ADMIN — IPRATROPIUM BROMIDE AND ALBUTEROL SULFATE 3 ML: .5; 2.5 SOLUTION RESPIRATORY (INHALATION) at 11:08

## 2022-08-19 NOTE — ED NOTES
BMT ONC Adult Bone Marrow Biopsy Procedure Note  September 29, 2020  /70   Pulse 122   Temp 99  F (37.2  C) (Oral)   Resp 20   Wt 67.8 kg (149 lb 7.6 oz)   LMP 03/01/2005   SpO2 95%   BMI 24.46 kg/m       Learning needs assessment complete within 12 months? NO    DIAGNOSIS: Multiple Myeloma    PROCEDURE: Unilateral Bone Marrow Biopsy and Unilateral Aspirate    LOCATION: [facility] 2nd Floor    Patient s identification was positively verified by verbal identification and invasive procedure safety checklist was completed. Informed consent was obtained. Following the administration of Midazolam as pre-medication, patient was placed in the prone position and prepped and draped in a sterile manner. Approximately 12 cc of 1% Lidocaine was used over the left posterior iliac spine. Following this a 3 mm incision was made. Trephine bone marrow core(s) was (were) obtained from the [facility]. Bone marrow aspirates were obtained from the [facility]. Aspirates were sent for morphology, immunophenotyping, cytogenetics and molecular diagnostics . A total of approximately 20 ml of marrow was aspirated. Following this procedure a sterile dressing was applied to the bone marrow biopsy site(s). The patient was placed in the supine position to maintain pressure on the biopsy site. Post-procedure wound care instructions were given.     Complications: NO    Pre-procedural pain: 0 out of 10 on the numeric pain rating scale.     Procedural pain: 3 out of 10 on the numeric pain rating scale.     Post-procedural pain assessment: 0 out of 10 on the numeric pain rating scale.     Interventions: NO    Length of procedure:20 minutes or less      Procedure performed by: Bolivar Willis PA-C     Transport hasn't shown up or called for Pt

## 2022-08-19 NOTE — ED NOTES
LOC: The patient is awake, alert and aware of environment with an appropriate affect, the patient is oriented x 3 and speaking appropriately.  APPEARANCE: Patient resting comfortably and in no acute distress, clubbing noted to fingers and toes w/ long fungus type nails, unable to apply gripper socks due to toenails  SKIN: The skin is warm and dry, patient has normal skin turgor and moist mucus membranes, skin intact, no breakdown or brusing noted.  MUSKULOSKELETAL: Right arm/wrist/lower forearm with +swelling , + pain w/ movement/, + ecchymosis  RESPIRATORY: Airway is open and patent, respirations are spontaneous, patient has a normal effort and rate. Breath sounds are  equal bilaterally, + whezes  CARDIAC: Normal heart sounds. No peripheral edema.  ABDOMEN: Soft and non tender to palpation, no distention noted. Bowel sounds present.

## 2022-08-19 NOTE — ED NOTES
Pt rounding complete.  Pain 0/10.  Restroom and comfort needs addressed/ urinal at bedside.  Pt updated on plan of care.  Call light within reach, pt on 4 L nasal canula. Sitting up in bed drinking coffee, will continue to monitor.
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
## 2022-08-23 DIAGNOSIS — R52 PAIN: Primary | ICD-10-CM

## 2022-08-24 ENCOUNTER — TELEPHONE (OUTPATIENT)
Dept: ORTHOPEDICS | Facility: CLINIC | Age: 67
End: 2022-08-24
Payer: MEDICARE

## 2022-08-24 NOTE — TELEPHONE ENCOUNTER
Left pt vm reminding him/her of his/her appoinment/x-rays.  Tried calling pt's emergency contacts no answer unable to leave VM.

## 2022-08-25 ENCOUNTER — TELEPHONE (OUTPATIENT)
Dept: ORTHOPEDICS | Facility: CLINIC | Age: 67
End: 2022-08-25
Payer: MEDICARE

## 2022-08-25 ENCOUNTER — DOCUMENTATION ONLY (OUTPATIENT)
Dept: ORTHOPEDICS | Facility: CLINIC | Age: 67
End: 2022-08-25
Payer: MEDICARE

## 2022-08-25 DIAGNOSIS — R52 PAIN: Primary | ICD-10-CM

## 2022-08-26 ENCOUNTER — TELEPHONE (OUTPATIENT)
Dept: ORTHOPEDICS | Facility: CLINIC | Age: 67
End: 2022-08-26
Payer: MEDICARE

## 2022-08-26 NOTE — TELEPHONE ENCOUNTER
Spoke with pt informed him that he has to be seen before Wednesday with new x-rays pt states he will call back to r/s doesnt know when he will come in due to no oxygen informed him that i r/s him to Monday 1030 Xr 11 with dominique

## 2022-08-29 ENCOUNTER — DOCUMENTATION ONLY (OUTPATIENT)
Dept: ORTHOPEDICS | Facility: CLINIC | Age: 67
End: 2022-08-29
Payer: MEDICARE

## 2022-10-21 ENCOUNTER — HOSPITAL ENCOUNTER (INPATIENT)
Facility: HOSPITAL | Age: 67
LOS: 1 days | Discharge: HOME HEALTH | DRG: 190 | End: 2022-10-22
Attending: EMERGENCY MEDICINE | Admitting: STUDENT IN AN ORGANIZED HEALTH CARE EDUCATION/TRAINING PROGRAM
Payer: MEDICARE

## 2022-10-21 DIAGNOSIS — R07.9 CHEST PAIN: ICD-10-CM

## 2022-10-21 DIAGNOSIS — Z09 HOSPITAL DISCHARGE FOLLOW-UP: ICD-10-CM

## 2022-10-21 DIAGNOSIS — R06.02 SOB (SHORTNESS OF BREATH): Primary | ICD-10-CM

## 2022-10-21 LAB
ALBUMIN SERPL BCP-MCNC: 3.9 G/DL (ref 3.5–5.2)
ALLENS TEST: ABNORMAL
ALLENS TEST: ABNORMAL
ALP SERPL-CCNC: 101 U/L (ref 55–135)
ALT SERPL W/O P-5'-P-CCNC: 32 U/L (ref 10–44)
ANION GAP SERPL CALC-SCNC: 9 MMOL/L (ref 8–16)
ANISOCYTOSIS BLD QL SMEAR: SLIGHT
AST SERPL-CCNC: 37 U/L (ref 10–40)
BACTERIA #/AREA URNS AUTO: ABNORMAL /HPF
BASOPHILS # BLD AUTO: 0.03 K/UL (ref 0–0.2)
BASOPHILS NFR BLD: 0.2 % (ref 0–1.9)
BILIRUB SERPL-MCNC: 0.3 MG/DL (ref 0.1–1)
BILIRUB UR QL STRIP: NEGATIVE
BNP SERPL-MCNC: 86 PG/ML (ref 0–99)
BUN SERPL-MCNC: 14 MG/DL (ref 8–23)
CALCIUM SERPL-MCNC: 9.6 MG/DL (ref 8.7–10.5)
CHLORIDE SERPL-SCNC: 101 MMOL/L (ref 95–110)
CLARITY UR REFRACT.AUTO: CLEAR
CO2 SERPL-SCNC: 31 MMOL/L (ref 23–29)
COLOR UR AUTO: YELLOW
CREAT SERPL-MCNC: 0.8 MG/DL (ref 0.5–1.4)
CTP QC/QA: YES
DELSYS: ABNORMAL
DELSYS: ABNORMAL
DIFFERENTIAL METHOD: ABNORMAL
EOSINOPHIL # BLD AUTO: 0.1 K/UL (ref 0–0.5)
EOSINOPHIL NFR BLD: 0.4 % (ref 0–8)
EP: 5
ERYTHROCYTE [DISTWIDTH] IN BLOOD BY AUTOMATED COUNT: 13.5 % (ref 11.5–14.5)
ERYTHROCYTE [SEDIMENTATION RATE] IN BLOOD BY WESTERGREN METHOD: 12 MM/H
EST. GFR  (NO RACE VARIABLE): >60 ML/MIN/1.73 M^2
FIO2: 50
FLOW: 6
GLUCOSE SERPL-MCNC: 117 MG/DL (ref 70–110)
GLUCOSE UR QL STRIP: NEGATIVE
HCO3 UR-SCNC: 39.3 MMOL/L (ref 24–28)
HCO3 UR-SCNC: 40.5 MMOL/L (ref 24–28)
HCT VFR BLD AUTO: 34.9 % (ref 40–54)
HGB BLD-MCNC: 10.4 G/DL (ref 14–18)
HGB UR QL STRIP: ABNORMAL
HYPOCHROMIA BLD QL SMEAR: ABNORMAL
IMM GRANULOCYTES # BLD AUTO: 0.06 K/UL (ref 0–0.04)
IMM GRANULOCYTES NFR BLD AUTO: 0.5 % (ref 0–0.5)
IP: 12
KETONES UR QL STRIP: NEGATIVE
LEUKOCYTE ESTERASE UR QL STRIP: NEGATIVE
LYMPHOCYTES # BLD AUTO: 1.5 K/UL (ref 1–4.8)
LYMPHOCYTES NFR BLD: 12.7 % (ref 18–48)
MAGNESIUM SERPL-MCNC: 2 MG/DL (ref 1.6–2.6)
MCH RBC QN AUTO: 28.4 PG (ref 27–31)
MCHC RBC AUTO-ENTMCNC: 29.8 G/DL (ref 32–36)
MCV RBC AUTO: 95 FL (ref 82–98)
MICROSCOPIC COMMENT: ABNORMAL
MODE: ABNORMAL
MODE: ABNORMAL
MONOCYTES # BLD AUTO: 0.8 K/UL (ref 0.3–1)
MONOCYTES NFR BLD: 6.9 % (ref 4–15)
NEUTROPHILS # BLD AUTO: 9.6 K/UL (ref 1.8–7.7)
NEUTROPHILS NFR BLD: 79.3 % (ref 38–73)
NITRITE UR QL STRIP: NEGATIVE
NRBC BLD-RTO: 0 /100 WBC
OVALOCYTES BLD QL SMEAR: ABNORMAL
PCO2 BLDA: 83.7 MMHG (ref 35–45)
PCO2 BLDA: 86.7 MMHG (ref 35–45)
PH SMN: 7.26 [PH] (ref 7.35–7.45)
PH SMN: 7.29 [PH] (ref 7.35–7.45)
PH UR STRIP: 5 [PH] (ref 5–8)
PLATELET # BLD AUTO: 171 K/UL (ref 150–450)
PMV BLD AUTO: ABNORMAL FL (ref 9.2–12.9)
PO2 BLDA: 125 MMHG (ref 80–100)
PO2 BLDA: 158 MMHG (ref 80–100)
POC BE: 12 MMOL/L
POC BE: 14 MMOL/L
POC SATURATED O2: 98 % (ref 95–100)
POC SATURATED O2: 99 % (ref 95–100)
POC TCO2: 42 MMOL/L (ref 23–27)
POC TCO2: 43 MMOL/L (ref 23–27)
POIKILOCYTOSIS BLD QL SMEAR: SLIGHT
POLYCHROMASIA BLD QL SMEAR: ABNORMAL
POTASSIUM SERPL-SCNC: 4.5 MMOL/L (ref 3.5–5.1)
PROT SERPL-MCNC: 7.5 G/DL (ref 6–8.4)
PROT UR QL STRIP: ABNORMAL
RBC # BLD AUTO: 3.66 M/UL (ref 4.6–6.2)
RBC #/AREA URNS AUTO: 56 /HPF (ref 0–4)
SAMPLE: ABNORMAL
SAMPLE: ABNORMAL
SARS-COV-2 RDRP RESP QL NAA+PROBE: NEGATIVE
SITE: ABNORMAL
SITE: ABNORMAL
SODIUM SERPL-SCNC: 141 MMOL/L (ref 136–145)
SP GR UR STRIP: 1.02 (ref 1–1.03)
SPHEROCYTES BLD QL SMEAR: ABNORMAL
TROPONIN I SERPL DL<=0.01 NG/ML-MCNC: 0.01 NG/ML (ref 0–0.03)
URN SPEC COLLECT METH UR: ABNORMAL
WBC # BLD AUTO: 12.1 K/UL (ref 3.9–12.7)
WBC #/AREA URNS AUTO: 1 /HPF (ref 0–5)

## 2022-10-21 PROCEDURE — 82803 BLOOD GASES ANY COMBINATION: CPT

## 2022-10-21 PROCEDURE — 85025 COMPLETE CBC W/AUTO DIFF WBC: CPT

## 2022-10-21 PROCEDURE — 25000242 PHARM REV CODE 250 ALT 637 W/ HCPCS

## 2022-10-21 PROCEDURE — 25000003 PHARM REV CODE 250

## 2022-10-21 PROCEDURE — 99900035 HC TECH TIME PER 15 MIN (STAT)

## 2022-10-21 PROCEDURE — 81001 URINALYSIS AUTO W/SCOPE: CPT

## 2022-10-21 PROCEDURE — 94761 N-INVAS EAR/PLS OXIMETRY MLT: CPT

## 2022-10-21 PROCEDURE — 27000221 HC OXYGEN, UP TO 24 HOURS

## 2022-10-21 PROCEDURE — 99285 EMERGENCY DEPT VISIT HI MDM: CPT | Mod: GW,GC,CS, | Performed by: EMERGENCY MEDICINE

## 2022-10-21 PROCEDURE — 36600 WITHDRAWAL OF ARTERIAL BLOOD: CPT

## 2022-10-21 PROCEDURE — 63600175 PHARM REV CODE 636 W HCPCS: Performed by: EMERGENCY MEDICINE

## 2022-10-21 PROCEDURE — 93005 ELECTROCARDIOGRAM TRACING: CPT

## 2022-10-21 PROCEDURE — 99285 EMERGENCY DEPT VISIT HI MDM: CPT | Mod: 25

## 2022-10-21 PROCEDURE — 20600001 HC STEP DOWN PRIVATE ROOM

## 2022-10-21 PROCEDURE — 94640 AIRWAY INHALATION TREATMENT: CPT

## 2022-10-21 PROCEDURE — 82800 BLOOD PH: CPT

## 2022-10-21 PROCEDURE — 93010 EKG 12-LEAD: ICD-10-PCS | Mod: GW,,, | Performed by: INTERNAL MEDICINE

## 2022-10-21 PROCEDURE — 93010 ELECTROCARDIOGRAM REPORT: CPT | Mod: GW,,, | Performed by: INTERNAL MEDICINE

## 2022-10-21 PROCEDURE — 87635 SARS-COV-2 COVID-19 AMP PRB: CPT

## 2022-10-21 PROCEDURE — 80053 COMPREHEN METABOLIC PANEL: CPT

## 2022-10-21 PROCEDURE — 84484 ASSAY OF TROPONIN QUANT: CPT

## 2022-10-21 PROCEDURE — 83735 ASSAY OF MAGNESIUM: CPT

## 2022-10-21 PROCEDURE — 96374 THER/PROPH/DIAG INJ IV PUSH: CPT

## 2022-10-21 PROCEDURE — 27000190 HC CPAP FULL FACE MASK W/VALVE

## 2022-10-21 PROCEDURE — 63600175 PHARM REV CODE 636 W HCPCS

## 2022-10-21 PROCEDURE — 99223 PR INITIAL HOSPITAL CARE,LEVL III: ICD-10-PCS | Mod: AI,GW,GC, | Performed by: STUDENT IN AN ORGANIZED HEALTH CARE EDUCATION/TRAINING PROGRAM

## 2022-10-21 PROCEDURE — 83880 ASSAY OF NATRIURETIC PEPTIDE: CPT

## 2022-10-21 PROCEDURE — 99223 1ST HOSP IP/OBS HIGH 75: CPT | Mod: AI,GW,GC, | Performed by: STUDENT IN AN ORGANIZED HEALTH CARE EDUCATION/TRAINING PROGRAM

## 2022-10-21 PROCEDURE — 94640 AIRWAY INHALATION TREATMENT: CPT | Mod: XB

## 2022-10-21 PROCEDURE — 99285 PR EMERGENCY DEPT VISIT,LEVEL V: ICD-10-PCS | Mod: GW,GC,CS, | Performed by: EMERGENCY MEDICINE

## 2022-10-21 RX ORDER — ENOXAPARIN SODIUM 100 MG/ML
40 INJECTION SUBCUTANEOUS EVERY 24 HOURS
Status: DISCONTINUED | OUTPATIENT
Start: 2022-10-21 | End: 2022-10-22 | Stop reason: HOSPADM

## 2022-10-21 RX ORDER — IPRATROPIUM BROMIDE AND ALBUTEROL SULFATE 2.5; .5 MG/3ML; MG/3ML
3 SOLUTION RESPIRATORY (INHALATION)
Status: COMPLETED | OUTPATIENT
Start: 2022-10-21 | End: 2022-10-21

## 2022-10-21 RX ORDER — GLUCAGON 1 MG
1 KIT INJECTION
Status: DISCONTINUED | OUTPATIENT
Start: 2022-10-21 | End: 2022-10-22 | Stop reason: HOSPADM

## 2022-10-21 RX ORDER — IBUPROFEN 200 MG
24 TABLET ORAL
Status: DISCONTINUED | OUTPATIENT
Start: 2022-10-21 | End: 2022-10-22 | Stop reason: HOSPADM

## 2022-10-21 RX ORDER — SENNOSIDES 8.6 MG/1
8.6 TABLET ORAL 2 TIMES DAILY
Status: DISCONTINUED | OUTPATIENT
Start: 2022-10-21 | End: 2022-10-22 | Stop reason: HOSPADM

## 2022-10-21 RX ORDER — FLUTICASONE FUROATE AND VILANTEROL 100; 25 UG/1; UG/1
1 POWDER RESPIRATORY (INHALATION) DAILY
Status: DISCONTINUED | OUTPATIENT
Start: 2022-10-22 | End: 2022-10-22 | Stop reason: HOSPADM

## 2022-10-21 RX ORDER — AZITHROMYCIN 250 MG/1
500 TABLET, FILM COATED ORAL DAILY
Status: DISCONTINUED | OUTPATIENT
Start: 2022-10-22 | End: 2022-10-21

## 2022-10-21 RX ORDER — NALOXONE HCL 0.4 MG/ML
0.02 VIAL (ML) INJECTION
Status: DISCONTINUED | OUTPATIENT
Start: 2022-10-21 | End: 2022-10-22 | Stop reason: HOSPADM

## 2022-10-21 RX ORDER — PREDNISONE 20 MG/1
40 TABLET ORAL DAILY
Status: DISCONTINUED | OUTPATIENT
Start: 2022-10-21 | End: 2022-10-22 | Stop reason: HOSPADM

## 2022-10-21 RX ORDER — IBUPROFEN 200 MG
1 TABLET ORAL DAILY
Status: DISCONTINUED | OUTPATIENT
Start: 2022-10-22 | End: 2022-10-22 | Stop reason: HOSPADM

## 2022-10-21 RX ORDER — SODIUM CHLORIDE 0.9 % (FLUSH) 0.9 %
3 SYRINGE (ML) INJECTION
Status: DISCONTINUED | OUTPATIENT
Start: 2022-10-21 | End: 2022-10-22 | Stop reason: HOSPADM

## 2022-10-21 RX ORDER — LEVOFLOXACIN 750 MG/1
750 TABLET ORAL DAILY
Status: DISCONTINUED | OUTPATIENT
Start: 2022-10-22 | End: 2022-10-22 | Stop reason: HOSPADM

## 2022-10-21 RX ORDER — IPRATROPIUM BROMIDE AND ALBUTEROL SULFATE 2.5; .5 MG/3ML; MG/3ML
3 SOLUTION RESPIRATORY (INHALATION)
Status: DISCONTINUED | OUTPATIENT
Start: 2022-10-21 | End: 2022-10-21

## 2022-10-21 RX ORDER — SODIUM CHLORIDE 0.9 % (FLUSH) 0.9 %
10 SYRINGE (ML) INJECTION EVERY 12 HOURS PRN
Status: DISCONTINUED | OUTPATIENT
Start: 2022-10-21 | End: 2022-10-22 | Stop reason: HOSPADM

## 2022-10-21 RX ORDER — POLYETHYLENE GLYCOL 3350 17 G/17G
17 POWDER, FOR SOLUTION ORAL 2 TIMES DAILY
Status: DISCONTINUED | OUTPATIENT
Start: 2022-10-21 | End: 2022-10-22 | Stop reason: HOSPADM

## 2022-10-21 RX ORDER — IPRATROPIUM BROMIDE AND ALBUTEROL SULFATE 2.5; .5 MG/3ML; MG/3ML
3 SOLUTION RESPIRATORY (INHALATION) EVERY 6 HOURS
Status: DISCONTINUED | OUTPATIENT
Start: 2022-10-21 | End: 2022-10-22 | Stop reason: HOSPADM

## 2022-10-21 RX ORDER — PREDNISONE 20 MG/1
60 TABLET ORAL
Status: COMPLETED | OUTPATIENT
Start: 2022-10-21 | End: 2022-10-21

## 2022-10-21 RX ORDER — ALBUTEROL SULFATE 90 UG/1
2 AEROSOL, METERED RESPIRATORY (INHALATION) EVERY 6 HOURS PRN
Status: DISCONTINUED | OUTPATIENT
Start: 2022-10-21 | End: 2022-10-22 | Stop reason: HOSPADM

## 2022-10-21 RX ORDER — TAMSULOSIN HYDROCHLORIDE 0.4 MG/1
0.4 CAPSULE ORAL 2 TIMES DAILY WITH MEALS
Status: DISCONTINUED | OUTPATIENT
Start: 2022-10-21 | End: 2022-10-22 | Stop reason: HOSPADM

## 2022-10-21 RX ORDER — IBUPROFEN 200 MG
16 TABLET ORAL
Status: DISCONTINUED | OUTPATIENT
Start: 2022-10-21 | End: 2022-10-22 | Stop reason: HOSPADM

## 2022-10-21 RX ORDER — GUAIFENESIN 600 MG/1
600 TABLET, EXTENDED RELEASE ORAL 2 TIMES DAILY
Status: DISCONTINUED | OUTPATIENT
Start: 2022-10-21 | End: 2022-10-22 | Stop reason: HOSPADM

## 2022-10-21 RX ORDER — METHYLPREDNISOLONE SOD SUCC 125 MG
125 VIAL (EA) INJECTION
Status: COMPLETED | OUTPATIENT
Start: 2022-10-21 | End: 2022-10-21

## 2022-10-21 RX ORDER — BENZONATATE 100 MG/1
100 CAPSULE ORAL 3 TIMES DAILY PRN
Status: DISCONTINUED | OUTPATIENT
Start: 2022-10-21 | End: 2022-10-22 | Stop reason: HOSPADM

## 2022-10-21 RX ORDER — LIDOCAINE HYDROCHLORIDE 10 MG/ML
5 INJECTION, SOLUTION EPIDURAL; INFILTRATION; INTRACAUDAL; PERINEURAL
Status: DISCONTINUED | OUTPATIENT
Start: 2022-10-21 | End: 2022-10-21

## 2022-10-21 RX ADMIN — TAMSULOSIN HYDROCHLORIDE 0.4 MG: 0.4 CAPSULE ORAL at 07:10

## 2022-10-21 RX ADMIN — IPRATROPIUM BROMIDE AND ALBUTEROL SULFATE 3 ML: 2.5; .5 SOLUTION RESPIRATORY (INHALATION) at 06:10

## 2022-10-21 RX ADMIN — METHYLPREDNISOLONE SODIUM SUCCINATE 125 MG: 125 INJECTION, POWDER, FOR SOLUTION INTRAMUSCULAR; INTRAVENOUS at 09:10

## 2022-10-21 RX ADMIN — GUAIFENESIN 600 MG: 600 TABLET, EXTENDED RELEASE ORAL at 10:10

## 2022-10-21 RX ADMIN — PREDNISONE 40 MG: 20 TABLET ORAL at 07:10

## 2022-10-21 RX ADMIN — PREDNISONE 60 MG: 20 TABLET ORAL at 11:10

## 2022-10-21 RX ADMIN — IPRATROPIUM BROMIDE AND ALBUTEROL SULFATE 3 ML: 2.5; .5 SOLUTION RESPIRATORY (INHALATION) at 07:10
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
## 2022-10-21 NOTE — ED NOTES
Pt awake and alert; resting quietly on stretcher. Pt remains on continuous cardiac and pulse ox monitoring with non-invasive blood pressure to cycle every 30 minutes. No acute distress or discomfort reported or observed. Bed locked in lowest position; side rails up and locked x 2; call light, bedside table, and personal belongings within reach. Plan of care discussed.
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
## 2022-10-21 NOTE — ED NOTES
Pt's IV removed due to infiltration. Pt stating he does not want another IV inserted. Pt also requesting to leave against medical advice. MD messaged via secure chat and made aware of situation.
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
## 2022-10-21 NOTE — CONSULTS
Critical Care Medicine Service  Consult Note     Consult received and acknowledged.  Patient evaluated and will be admitted to MICU for further evaluation.  Full H&P to follow.     Sha Daniel MD  Internal Medicine PGY3  Critical Care Medicine Service

## 2022-10-21 NOTE — ED NOTES
Pt now stating he doesn't want to leave AMA and wants to stay, still refusing blood draws. MD notified and aware.

## 2022-10-21 NOTE — PLAN OF CARE
Critical Care Medicine  Plan of Care Note    In brief, Mr Dashawn Zuleta is a 60yo M with COPD (on home 6L O2), prostate CA, BPH, previous IE who initially presented today with shortness of breath for several days.  Initial ABG concerning for respiratory acidosis/hypercapnia, placed on BiPAP by ED and Critical Care Medicine was consulted for continuous BiPAP in setting of acute hypercapnic respiratory failure.  On my exam, patient was off of BiPAP (had been on for 2 hours), back on home 6L NC satting 100%.  Alert and oriented x4, mentating appropriately.  In no respiratory distress and reports breathing back to baseline.  Repeat ABG showed mild improvement in hypercapnia, but upon review of past ABGs, patient close to baseline.  CXR showing possible apical small right pneumothorax, however on CT, this appears to be an apical bleb.  Initially planned to admit patient for COPD exacerbation, however prior to admission, critical care team was notified by the ED that patient wishes to AMA, thus admission cancelled.     Sha Daniel MD  Internal Medicine PGY3  Critical Care Medicine

## 2022-10-21 NOTE — Clinical Note
Diagnosis: SOB (shortness of breath) [521272]  Admitting Provider:: DOUGLAS MADDOX [[id number]]  Future Attending Provider: JENARO FAGAN [[id number]]  Reason for IP Medical Treatment  (Clinical interventions that can only be accomplished in the IP sett ing? ) :: COPD Exacerbation  Estimated Length of Stay:: 2 midnights  I certify that Inpatient services for greater than or equal to 2 midnights are medically necessary:: Yes  Plans for Post-Acute care--if anticipated (pick the single best option):: A . No post acute care anticipated at this time

## 2022-10-21 NOTE — ED NOTES
I assumed care of this patient at this time. Pt is resting in ED stretcher. Pt currently on 6 L O2 via NC (baseline). Bed low and locked; side rails up x2; call light within reach. Denies needs at this time.

## 2022-10-21 NOTE — PLAN OF CARE
Per Mr Merlyn, he has been working with [facility] for some time.  States he will move in there soon  Case mgt to remain supportive and assist as needed    [facility] - Emergency Dept  Initial Discharge Assessment       Primary Care Provider: Primary Doctor No    Admission Diagnosis: SOB (shortness of breath) [R06.02]    Admission Date: 10/21/2022  Expected Discharge Date:          Payor: HU[employer] MANAGED MEDICARE / Plan: HU[employer] SNP (SPECIAL NEEDS PLAN) / Product Type: Medicare Advantage /     Extended Emergency Contact Information  Primary Emergency Contact: Estrella Singh   [facility]  Home Phone: [phone]  Relation: Spouse  Secondary Emergency Contact: Janice Liriano   [place]  Mobile Phone: [phone]  Relation: Other    Discharge Plan A: (P) Home  Discharge Plan B: (P) New Nursing Home placement - [facility]      [facility] [address] [place] [address] - [address] AT [facility]  [address]  [facility] [address]  Phone: [phone] Fax: [phone]      Initial Assessment (most recent)       Adult Discharge Assessment - 10/21/22 7766          Discharge Assessment    Assessment Type Discharge Planning Assessment (P)      Source of Information patient;health record (P)      Prior to hospitilization cognitive status: Alert/Oriented (P)      Current cognitive status: Alert/Oriented (P)      Discharge Plan A Home (P)      Discharge Plan B New Nursing Home placement - jail care facility (P)

## 2022-10-22 VITALS
TEMPERATURE: 98 F | BODY MASS INDEX: 24.59 KG/M2 | RESPIRATION RATE: 19 BRPM | HEART RATE: 86 BPM | SYSTOLIC BLOOD PRESSURE: 110 MMHG | HEIGHT: 60 IN | OXYGEN SATURATION: 100 % | WEIGHT: 125.25 LBS | DIASTOLIC BLOOD PRESSURE: 65 MMHG

## 2022-10-22 PROCEDURE — 99900026 HC AIRWAY MAINTENANCE (STAT)

## 2022-10-22 PROCEDURE — 99239 PR HOSPITAL DISCHARGE DAY,>30 MIN: ICD-10-PCS | Mod: GW,GC,, | Performed by: STUDENT IN AN ORGANIZED HEALTH CARE EDUCATION/TRAINING PROGRAM

## 2022-10-22 PROCEDURE — 99900035 HC TECH TIME PER 15 MIN (STAT)

## 2022-10-22 PROCEDURE — 25000242 PHARM REV CODE 250 ALT 637 W/ HCPCS

## 2022-10-22 PROCEDURE — 99239 HOSP IP/OBS DSCHRG MGMT >30: CPT | Mod: GW,GC,, | Performed by: STUDENT IN AN ORGANIZED HEALTH CARE EDUCATION/TRAINING PROGRAM

## 2022-10-22 PROCEDURE — 25000003 PHARM REV CODE 250

## 2022-10-22 PROCEDURE — 27000221 HC OXYGEN, UP TO 24 HOURS

## 2022-10-22 PROCEDURE — 63600175 PHARM REV CODE 636 W HCPCS

## 2022-10-22 PROCEDURE — 94640 AIRWAY INHALATION TREATMENT: CPT

## 2022-10-22 PROCEDURE — S4991 NICOTINE PATCH NONLEGEND: HCPCS

## 2022-10-22 RX ORDER — ATORVASTATIN CALCIUM 40 MG/1
40 TABLET, FILM COATED ORAL DAILY
Qty: 90 TABLET | Refills: 3 | Status: SHIPPED | OUTPATIENT
Start: 2022-10-22 | End: 2023-10-22

## 2022-10-22 RX ORDER — LEVOFLOXACIN 750 MG/1
750 TABLET ORAL DAILY
Qty: 5 TABLET | Refills: 0 | Status: SHIPPED | OUTPATIENT
Start: 2022-10-22 | End: 2022-10-27

## 2022-10-22 RX ORDER — BENZONATATE 100 MG/1
100 CAPSULE ORAL 3 TIMES DAILY PRN
Qty: 15 CAPSULE | Refills: 0 | Status: SHIPPED | OUTPATIENT
Start: 2022-10-22 | End: 2022-10-27

## 2022-10-22 RX ORDER — BENZONATATE 100 MG/1
100 CAPSULE ORAL 3 TIMES DAILY PRN
Qty: 15 CAPSULE | Refills: 0 | Status: SHIPPED | OUTPATIENT
Start: 2022-10-22 | End: 2022-10-22 | Stop reason: SDUPTHER

## 2022-10-22 RX ORDER — GUAIFENESIN 600 MG/1
600 TABLET, EXTENDED RELEASE ORAL 2 TIMES DAILY
Qty: 10 TABLET | Refills: 0 | Status: SHIPPED | OUTPATIENT
Start: 2022-10-22

## 2022-10-22 RX ORDER — ASPIRIN 81 MG/1
81 TABLET ORAL DAILY
Qty: 360 TABLET | Refills: 0 | Status: SHIPPED | OUTPATIENT
Start: 2022-10-22 | End: 2023-10-22

## 2022-10-22 RX ADMIN — FLUTICASONE FUROATE AND VILANTEROL TRIFENATATE 1 PUFF: 100; 25 POWDER RESPIRATORY (INHALATION) at 10:10

## 2022-10-22 RX ADMIN — GUAIFENESIN 600 MG: 600 TABLET, EXTENDED RELEASE ORAL at 09:10

## 2022-10-22 RX ADMIN — SENNOSIDES 8.6 MG: 8.6 TABLET, FILM COATED ORAL at 09:10

## 2022-10-22 RX ADMIN — IPRATROPIUM BROMIDE AND ALBUTEROL SULFATE 3 ML: 2.5; .5 SOLUTION RESPIRATORY (INHALATION) at 07:10

## 2022-10-22 RX ADMIN — TIOTROPIUM BROMIDE INHALATION SPRAY 2 PUFF: 3.12 SPRAY, METERED RESPIRATORY (INHALATION) at 10:10

## 2022-10-22 RX ADMIN — IPRATROPIUM BROMIDE AND ALBUTEROL SULFATE 3 ML: 2.5; .5 SOLUTION RESPIRATORY (INHALATION) at 12:10

## 2022-10-22 RX ADMIN — PREDNISONE 40 MG: 20 TABLET ORAL at 09:10

## 2022-10-22 RX ADMIN — Medication 1 PATCH: at 09:10

## 2022-10-22 RX ADMIN — TAMSULOSIN HYDROCHLORIDE 0.4 MG: 0.4 CAPSULE ORAL at 09:10

## 2022-10-22 RX ADMIN — LEVOFLOXACIN 750 MG: 750 TABLET, FILM COATED ORAL at 09:10

## 2022-10-22 NOTE — PLAN OF CARE
"[facility] - Telemetry Stepdown      HOME HEALTH ORDERS  FACE TO FACE ENCOUNTER    Patient Name: Dashawn Zuleta  YOB: 1955    PCP: Primary Doctor No   PCP Address: None  PCP Phone Number: None  PCP Fax: None    Encounter Date: 10/21/22    Admit to Home Health    Diagnoses:  Active Hospital Problems    Diagnosis  POA    *Chronic obstructive pulmonary disease with acute exacerbation [J44.1]  Yes    Essential hypertension [I10]  Yes    Severe protein-calorie malnutrition [E43]  Yes    Diastolic dysfunction [I51.89]  Yes    Tobacco abuse [Z72.0]  Yes    Benign prostatic hyperplasia without lower urinary tract symptoms [N40.0]  Yes      Resolved Hospital Problems   No resolved problems to display.       Follow Up Appointments:  No future appointments.    Allergies:  Review of patient's allergies indicates:   Allergen Reactions    Percocet [oxycodone-acetaminophen] Other (See Comments)     Pt states this drug gives him "bad dreams", he does not want this prescribed       Medications: Review discharge medications with patient and family and provide education.      I have seen and examined this patient within the last 30 days. My clinical findings that support the need for the home health skilled services and home bound status are the following:no   Weakness/numbness causing balance and gait disturbance due to COPD Exacerbation and Weakness/Debility making it taxing to leave home.  Requiring assistive device to leave home due to unsteady gait caused by  COPD Exacerbation and Weakness/Debility.  Medical restrictions requiring assistance of another human to leave home due to  Dyspnea on exertion (SOB), Frequent Falls, Decreased range of motions in extremities, and Home oxygen requirement.     Diet:   regular diet      Referrals/ Consults  Physical Therapy to evaluate and treat. Evaluate for home safety and equipment needs; Establish/upgrade home exercise program. Perform / instruct on therapeutic exercises, " gait training, transfer training, and Range of Motion.  Occupational Therapy to evaluate and treat. Evaluate home environment for safety and equipment needs. Perform/Instruct on transfers, ADL training, ROM, and therapeutic exercises.  Aide to provide assistance with personal care, ADLs, and vital signs.    Activities:   Can transfer from bed to electric chair.      Miscellaneous   Routine Skin for Bedridden Patients: Instruct patient/caregiver to apply moisture barrier cream to all skin folds and wet areas in perineal area daily and after baths and all bowel movements.  Home Oxygen:  No change    Home Health Aide:  Physical Therapy Three times weekly, Occupational Therapy Three times weekly, and Home Health Aide Three times weekly      I certify that this patient is confined to his home and needs physical therapy and occupational therapy.    Elías Campoverde MD  [facility]

## 2022-10-22 NOTE — ASSESSMENT & PLAN NOTE
Will consider RD consultation in the am                        Measurements:  Wt Readings from Last 1 Encounters:   10/21/22 56.7 kg (125 lb)   Body mass index is 24.41 kg/m².    Recommendations:      Patient has been screened and assessed by RD. RD will follow patient.

## 2022-10-22 NOTE — NURSING
Spoke with Alejandrina and she stated she paid for his medications over the phone and that she is out of state at the moment. Alejandrina states that patient room has his scooter in it and patient knows how to get around.  Patient also states that he can move around in his scooter.
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
## 2022-10-22 NOTE — DISCHARGE SUMMARY
[facility] - Telemetry [facility]  Discharge Summary      Patient Name: Dashawn Zuleta  MRN: [id number]  Patient Class: IP- Inpatient  Admission Date: 10/21/2022  Hospital Length of Stay: 1 days  Discharge Date and Time:  10/22/2022 3:17 PM  Attending Physician: Darren Jo DO   Discharging Provider: Amanda Lombardo MD  Primary Care Provider: Primary Doctor [facility] Medicine Team: [facility] Amanda Lombardo MD    HPI:   Mr Dashawn Zuleta is a 60yo M with COPD (on home 6L O2), prostate CA, BPH, previous IE who initially presented today with shortness of breath for several days. Evaluation was limited by patient cooperation.SOB has been associated with productive cough that produces yellow-green sputum. States he took some antibiotics, unable to identify why or what they were, however says it exacerbated his COPD. The inhaler has not relieved it. He denies any recent travel or sick contact. Smokes 1-2 cigarettes/dy. He states he takes 3 inhalers at home: breo, spiriva, and fluticasone furoate-vilanterol 100-25 mcg. Does not follow with with a pulmonologist. He states fevers and chills. Denies any chest pain, sore throat.     Pt denies chest pain, sore throat, abdominal pain, n/v/d,headache, numbness or tingling or weakness of the extremities       ED Course:  In the ED, pt was afebrile, tachycardic , tachypnic 18-24, hypertensive 10-170s/80s, % 6LNC. Initial labs notable for elevated HCO3 (31), BNP 86, ABG showed pH 7.2, Co2 of 86.7, PO2 158, HCO3 of 39.3. CXR showing possible apical small right pneumothorax, however on CT, this appears to be an apical bleb. In the ED received methylprednisone 125 mg IV, prednisone 60 mg, albuterol-ipratropium breathing treatment, along with MICU consult d/t being placed on continuous BiPAP. Pt eventually weaned off of BiPAP and ABG showed mild improvement in hypercapnia, close to baseline. in the ED             * No surgery found *      Hospital  Course:   Mr. Zuleta is a 66 yo male admitted for COPD exacerbation (on home 5L NC).  Pt's initial ABG were concerning hypercapnia. CXR were concerning for pneumothorax but CT showed apical blebs. MICU was consulted who started continuous BIPAP. He was eventually weaned off. Subsequently requested to leave AMA, but agreed to stay for ongoing COPD exacerbation evaluation and treatment. Started on home regimen: breo, spiriva, and fluticasone furoate-vilanterol 100-25 mcg, X1 dose of methylprednisone 125 mg IV, prednisone 60 mg, albuterol-ipratropium breathing treatment, along with mucinex and tessalon pearls. States improvements in symptoms and is medically ready for discharge.      Vitals:    10/22/22 0831 10/22/22 1045 10/22/22 1153 10/22/22 1410   BP: 124/65  129/62    BP Location: Right arm  Left arm    Patient Position: Lying  Lying    Pulse: 78 78 77 89   Resp: 16 16 18 (!) 23   Temp: 97.7 °F (36.5 °C)  98.5 °F (36.9 °C)    TempSrc: Oral  Oral    SpO2: 100%  97%    Weight:       Height:           Review of Systems   Constitutional:  Negative for chills and fever. Negative for appetite change, fatigue and unexpected weight change.   HENT:  Positive for congestion. Negative for hearing loss and sore throat.    Eyes:  Negative for visual disturbance.   Respiratory:  Positive for cough, shortness of breath Negative for wheezing.    Cardiovascular:  Negative for chest pain, palpitations and leg swelling.   Gastrointestinal:  Positive for constipation. Negative for abdominal pain, blood in stool, diarrhea, nausea and vomiting.   Genitourinary:  Negative for difficulty urinating.   Musculoskeletal:  Negative for arthralgias.   Skin:  Negative for pallor.   Allergic/Immunologic: Negative for immunocompromised state.   Neurological:  Negative for dizziness, weakness, light-headedness, numbness and headaches.   Hematological:  Does not bruise/bleed easily.     Physical Exam  Constitutional:       General: He is not in  acute distress.     Appearance: Normal appearance. He is not ill-appearing.   HENT:      Head: Normocephalic and atraumatic.      Mouth/Throat:      Mouth: Mucous membranes are moist.   Eyes:      Extraocular Movements: Extraocular movements intact.      Conjunctiva/sclera: Conjunctivae normal.      Pupils: Pupils are equal, round, and reactive to light.   Cardiovascular:      Rate and Rhythm: Normal rate and regular rhythm.      Pulses:           Dorsalis pedis pulses are 3+ on the right side and 3+ on the left side.      Heart sounds: Normal heart sounds.   Pulmonary:      Effort: Pulmonary effort is normal. No respiratory distress.      Breath sounds: Normal breath sounds.   Abdominal:      General: Abdomen is flat. Bowel sounds are normal. There is no distension.      Palpations: Abdomen is soft.      Tenderness: There is no abdominal tenderness. There is no guarding.   Musculoskeletal:         General: No swelling.      Right lower leg: No edema.      Left lower leg: No edema.   Lymphadenopathy:      Cervical: No cervical adenopathy.   Skin:     General: Skin is warm.      Capillary Refill: Capillary refill takes less than 2 seconds.   Neurological:      General: No focal deficit present.      Mental Status: He is alert and oriented to person, place, and time.   Psychiatric:         Mood and Affect: Mood normal.         Behavior: Behavior normal.   Goals of Care Treatment Preferences:  Code Status: Full Code      Consults:   Consults (From admission, onward)        Status Ordering Provider     Inpatient consult to Critical Care Medicine  Once        Provider:  (Not yet assigned)    Completed SESSIONS, DOUGLAS DANIELSON          No new Assessment & Plan notes have been filed under this hospital service since the last note was generated.  Service: Hospital Medicine    Final Active Diagnoses:    Diagnosis Date Noted POA    PRINCIPAL PROBLEM:  Chronic obstructive pulmonary disease with acute exacerbation [J44.1]  07/04/2019 Yes    Essential hypertension [I10] 07/04/2019 Yes    Severe protein-calorie malnutrition [E43] 07/04/2019 Yes    Diastolic dysfunction [I51.89] 08/13/2013 Yes    Tobacco abuse [Z72.0] 08/09/2013 Yes    Benign prostatic hyperplasia without lower urinary tract symptoms [N40.0] 10/11/2012 Yes      Problems Resolved During this Admission:       Discharged Condition: stable    Disposition:     Follow Up:    Patient Instructions:      Ambulatory referral/consult to Internal Medicine   Standing Status: Future   Referral Priority: Routine Referral Type: Consultation   Referral Reason: Specialty Services Required   Requested Specialty: Internal Medicine   Number of Visits Requested: 1       Significant Diagnostic Studies: Labs:   CMP   Recent Labs   Lab 10/21/22  0937      K 4.5      CO2 31*   *   BUN 14   CREATININE 0.8   CALCIUM 9.6   PROT 7.5   ALBUMIN 3.9   BILITOT 0.3   ALKPHOS 101   AST 37   ALT 32   ANIONGAP 9    and CBC   Recent Labs   Lab 10/21/22  0937   WBC 12.10   HGB 10.4*   HCT 34.9*          Pending Diagnostic Studies:     Procedure Component Value Units Date/Time    CBC Auto Differential [[id number]]     Order Status: Sent Lab Status: No result     Specimen: Blood     Comprehensive metabolic panel [[id number]]     Order Status: Sent Lab Status: No result     Specimen: Blood          Medications:  Reconciled Home Medications:      Medication List      START taking these medications    aspirin 81 MG EC tablet  Commonly known as: ECOTRIN  Take 1 tablet (81 mg total) by mouth once daily.     atorvastatin 40 MG tablet  Commonly known as: LIPITOR  Take 1 tablet (40 mg total) by mouth once daily.     benzonatate 100 MG capsule  Commonly known as: TESSALON  Take 1 capsule (100 mg total) by mouth 3 (three) times daily as needed for Cough.     levoFLOXacin 750 MG tablet  Commonly known as: LEVAQUIN  Take 1 tablet (750 mg total) by mouth once daily. for 5 days     MUCUS RELIEF ER  600 mg 12 hr tablet  Generic drug: guaiFENesin  Take 1 tablet (600 mg total) by mouth 2 (two) times daily.        CONTINUE taking these medications    albuterol 90 mcg/actuation inhaler  Commonly known as: PROVENTIL/VENTOLIN HFA  Inhale 2 puffs into the lungs every 6 (six) hours as needed.     fluticasone furoate-vilanteroL 100-25 mcg/dose diskus inhaler  Commonly known as: BREO  Inhale 1 puff into the lungs once daily. Controller     ipratropium-albuteroL  mcg/actuation inhaler  Commonly known as: CombiVENT  Inhale 1 puff into the lungs every 6 (six) hours as needed for Wheezing. Rescue     nicotine 14 mg/24 hr  Commonly known as: NICODERM CQ  Place 1 patch onto the skin once daily.     tamsulosin 0.4 mg Cap  Commonly known as: FLOMAX  Take 1 capsule (0.4 mg total) by mouth 2 (two) times daily with meals.     tiotropium 18 mcg inhalation capsule  Commonly known as: SPIRIVA  Inhale 1 capsule (18 mcg total) into the lungs once daily.        STOP taking these medications    LIDOcaine 5 %  Commonly known as: LIDODERM            Indwelling Lines/Drains at time of discharge:   Lines/Drains/Airways     None                 Time spent on the discharge of patient: 45 minutes         Amanda Lombardo MD  Department of Hospital Medicine  [facility] - Telemetry Stepdown

## 2022-10-22 NOTE — PLAN OF CARE
10/22/22 1108   Post-Acute Status   Post-Acute Authorization Home Health   Home Health Status Referrals Sent  ([facility]-NO (Awaiting decision))       Mikaela Cook LMSW  Case Management Social Worker   [facility] [address]

## 2022-10-22 NOTE — NURSING
Patient refused this nurse from finishing full body assessment.    Patient refused this nurse from inserting IV    Patient refused this nurse from checking blood sugar

## 2022-10-22 NOTE — ASSESSMENT & PLAN NOTE
Patient is identified as having Diastolic (HFpEF) heart failure that is Chronic. CHF is currently controlled. Latest ECHO performed and demonstrates- No results found for this or any previous visit.  . Not currently on any medications. Will monitor clinical status closely. Monitor on telemetry. Patient is off CHF pathway.  Monitor strict Is&Os and daily weights. Continue to stress to patient importance of self efficacy and  on diet for CHF. Last BNP reviewed- and noted below   Recent Labs   Lab 10/21/22  0937   BNP 86   .

## 2022-10-22 NOTE — HOSPITAL COURSE
Mr. Zuleta is a 68 yo male admitted for COPD exacerbation (on home 5L NC).  Pt's initial ABG were concerning hypercapnia. CXR were concerning for pneumothorax but CT showed apical blebs. MICU was consulted who started continuous BIPAP. He was eventually weaned off. Subsequently requested to leave AMA, but agreed to stay for ongoing COPD exacerbation evaluation and treatment. Started on home regimen: breo, spiriva, and fluticasone furoate-vilanterol 100-25 mcg, X1 dose of methylprednisone 125 mg IV, prednisone 60 mg, albuterol-ipratropium breathing treatment, along with mucinex and tessalon pearls. States improvements in symptoms and is medically ready for discharge.

## 2022-10-22 NOTE — ASSESSMENT & PLAN NOTE
Pt smokes 1-2 cigarretes. Smoking cessation encourage, however declined by patient at this time.

## 2022-10-22 NOTE — ASSESSMENT & PLAN NOTE
No recent PFTs on file. On home 6L O2. Takes home albuterol 90mcg, breo 100-25mcg/dose inhaler, ipratropium-albuterol  mg, Spiriva inhaler. States he smokes 1-2 packs of cigarette/dy. Initial ABG concerning for respiratory acidosis/hypercapnia, placed on BiPAP by ED and Critical Care Medicine was consulted for continuous BiPAP in setting of acute hypercapnic respiratory failure and was eventually weaned off. CXR showing possible apical small right pneumothorax, however on CT appears as apical bleb.       Plan:  - Abx levofloxacin   - Duo-nebs q6hr PRN  - Prednisone 40mg daily x 5 days  - Wean supplemental O2 with goal SpO2 88-92%  - Emphasized importance of smoking cessation  - Resumed home regimen  - guaifenesin 600 mg BID and benzonatate 100 mg TID for symptomatic relief of cough

## 2022-10-22 NOTE — PT/OT/SLP PROGRESS
"Physical Therapy      Patient Name:  Dashawn Zuleta   MRN:  [id number]    Patient not seen today secondary to Patient unwilling to participate. Pt found in bed states "I tell you what I am not getting up to walk, if this leg will let me then this one wont".  Pt educated on role of PT as well as benefits of mobility however patient states he will not participate in functional mobility portion of evaluation.  Will follow-up when appropriate.    "

## 2022-10-22 NOTE — ASSESSMENT & PLAN NOTE
-- patient not currently taking any antihypertensive medications at home. Normotensive on admission  -- CTM; can add BP regimen if necessary

## 2022-10-22 NOTE — HPI
Mr Dashawn Zuleta is a 62yo M with COPD (on home 6L O2), prostate CA, BPH, previous IE who initially presented today with shortness of breath for several days. Evaluation was limited by patient cooperation.SOB has been associated with productive cough that produces yellow-green sputum. States he took some antibiotics, unable to identify why or what they were, however says it exacerbated his COPD. The inhaler has not relieved it. He denies any recent travel or sick contact. Smokes 1-2 cigarettes/dy. He states he takes 3 inhalers at home: breo, spiriva, and fluticasone furoate-vilanterol 100-25 mcg. Does not follow with with a pulmonologist. He states fevers and chills. Denies any chest pain, sore throat.     Pt denies chest pain, sore throat, abdominal pain, n/v/d,headache, numbness or tingling or weakness of the extremities       ED Course:  In the ED, pt was afebrile, tachycardic , tachypnic 18-24, hypertensive 10-170s/80s, % 6LNC. Initial labs notable for elevated HCO3 (31), BNP 86, ABG showed pH 7.2, Co2 of 86.7, PO2 158, HCO3 of 39.3. CXR showing possible apical small right pneumothorax, however on CT, this appears to be an apical bleb. In the ED received methylprednisone 125 mg IV, prednisone 60 mg, albuterol-ipratropium breathing treatment, along with MICU consult d/t being placed on continuous BiPAP. Pt eventually weaned off of BiPAP and ABG showed mild improvement in hypercapnia, close to baseline. in the ED

## 2022-10-22 NOTE — PT/OT/SLP PROGRESS
"Occupational Therapy      Patient Name:  Dashawn Zuleta   MRN:  [id number]    Patient not seen today secondary to Pt refusing to participate. Pt stating, "If you think I'm going to get up out of this bed, forget about it". Pt insisting on going home despite recognizing the need for help. Pt reports he uses a power wheelchair. Will follow-up on 10/23.    10/22/2022  "

## 2022-10-22 NOTE — PLAN OF CARE
[facility] - Telemetry Stepdown  Discharge Final Note    Primary Care Provider: Primary Doctor No    Expected Discharge Date: 10/22/2022    Final Discharge Note (most recent)       Final Note - 10/22/22 1527          Final Note    Assessment Type Final Discharge Note (P)      Anticipated Discharge Disposition Home-Health Care Svc (P)    [facility]-NO (SOC TUES 10/25)       Post-Acute Status    Post-Acute Authorization Home Health (P)      Home Health Status Set-up Complete/Auth obtained (P)      Discharge Delays None known at this time (P)                      Important Message from Medicare         Transport set up for 3:30 PM.    Mikaela Cook LMSW  Case Management Social Worker   [facility] [address]

## 2022-10-25 ENCOUNTER — PATIENT OUTREACH (OUTPATIENT)
Dept: ADMINISTRATIVE | Facility: CLINIC | Age: 67
End: 2022-10-25
Payer: OTHER MISCELLANEOUS

## 2022-10-25 NOTE — PROGRESS NOTES
C3 nurse attempted to contact Dashawn Zuleta  for a TCC post hospital discharge follow up call. No answer. The patient does not have a scheduled HOSFU appointment, and the pt does not have an [facility] PCP.

## 2022-10-26 NOTE — PROGRESS NOTES
C3 Nurse made third attempt to reach patient for TCC call. Left voicemail asking patient to please call [phone] and leave first name, last name, and , and Lina will return call.

## 2022-11-26 ENCOUNTER — HOSPITAL ENCOUNTER (EMERGENCY)
Facility: HOSPITAL | Age: 67
Discharge: HOME | End: 2022-11-27
Attending: EMERGENCY MEDICINE
Payer: MEDICARE

## 2022-11-26 DIAGNOSIS — J44.1 COPD EXACERBATION: Primary | ICD-10-CM

## 2022-11-26 DIAGNOSIS — R06.02 SHORTNESS OF BREATH: ICD-10-CM

## 2022-11-26 LAB
ALBUMIN SERPL BCP-MCNC: 3.3 G/DL (ref 3.5–5.2)
ALP SERPL-CCNC: 71 U/L (ref 55–135)
ALT SERPL W/O P-5'-P-CCNC: 17 U/L (ref 10–44)
ANION GAP SERPL CALC-SCNC: 11 MMOL/L (ref 8–16)
AST SERPL-CCNC: 26 U/L (ref 10–40)
BASOPHILS # BLD AUTO: 0.03 K/UL (ref 0–0.2)
BASOPHILS NFR BLD: 0.3 % (ref 0–1.9)
BILIRUB SERPL-MCNC: 0.2 MG/DL (ref 0.1–1)
BUN SERPL-MCNC: 16 MG/DL (ref 8–23)
CALCIUM SERPL-MCNC: 9 MG/DL (ref 8.7–10.5)
CHLORIDE SERPL-SCNC: 103 MMOL/L (ref 95–110)
CO2 SERPL-SCNC: 31 MMOL/L (ref 23–29)
CREAT SERPL-MCNC: 0.8 MG/DL (ref 0.5–1.4)
DIFFERENTIAL METHOD: ABNORMAL
EOSINOPHIL # BLD AUTO: 0.3 K/UL (ref 0–0.5)
EOSINOPHIL NFR BLD: 3.3 % (ref 0–8)
ERYTHROCYTE [DISTWIDTH] IN BLOOD BY AUTOMATED COUNT: 14 % (ref 11.5–14.5)
EST. GFR  (NO RACE VARIABLE): >60 ML/MIN/1.73 M^2
GLUCOSE SERPL-MCNC: 132 MG/DL (ref 70–110)
HCT VFR BLD AUTO: 33.6 % (ref 40–54)
HGB BLD-MCNC: 9.9 G/DL (ref 14–18)
IMM GRANULOCYTES # BLD AUTO: 0.02 K/UL (ref 0–0.04)
IMM GRANULOCYTES NFR BLD AUTO: 0.2 % (ref 0–0.5)
INFLUENZA A, MOLECULAR: NOT DETECTED
INFLUENZA B, MOLECULAR: NOT DETECTED
LYMPHOCYTES # BLD AUTO: 2.3 K/UL (ref 1–4.8)
LYMPHOCYTES NFR BLD: 26 % (ref 18–48)
MCH RBC QN AUTO: 28.8 PG (ref 27–31)
MCHC RBC AUTO-ENTMCNC: 29.5 G/DL (ref 32–36)
MCV RBC AUTO: 98 FL (ref 82–98)
MONOCYTES # BLD AUTO: 0.8 K/UL (ref 0.3–1)
MONOCYTES NFR BLD: 9.5 % (ref 4–15)
NEUTROPHILS # BLD AUTO: 5.3 K/UL (ref 1.8–7.7)
NEUTROPHILS NFR BLD: 60.7 % (ref 38–73)
NRBC BLD-RTO: 0 /100 WBC
PLATELET # BLD AUTO: 250 K/UL (ref 150–450)
PMV BLD AUTO: 10.3 FL (ref 9.2–12.9)
POTASSIUM SERPL-SCNC: 4.4 MMOL/L (ref 3.5–5.1)
PROT SERPL-MCNC: 6.1 G/DL (ref 6–8.4)
RBC # BLD AUTO: 3.44 M/UL (ref 4.6–6.2)
RSV AG BY MOLECULAR METHOD: NOT DETECTED
SARS-COV-2 RNA RESP QL NAA+PROBE: NOT DETECTED
SODIUM SERPL-SCNC: 145 MMOL/L (ref 136–145)
WBC # BLD AUTO: 8.77 K/UL (ref 3.9–12.7)

## 2022-11-26 PROCEDURE — 63600175 PHARM REV CODE 636 W HCPCS: Performed by: EMERGENCY MEDICINE

## 2022-11-26 PROCEDURE — 0241U SARS-COV2 (COVID) WITH FLU/RSV BY PCR: CPT

## 2022-11-26 PROCEDURE — 96375 TX/PRO/DX INJ NEW DRUG ADDON: CPT

## 2022-11-26 PROCEDURE — 93010 EKG 12-LEAD: ICD-10-PCS | Mod: GW,,, | Performed by: INTERNAL MEDICINE

## 2022-11-26 PROCEDURE — 99284 EMERGENCY DEPT VISIT MOD MDM: CPT | Mod: GW,CS,, | Performed by: EMERGENCY MEDICINE

## 2022-11-26 PROCEDURE — 85025 COMPLETE CBC W/AUTO DIFF WBC: CPT

## 2022-11-26 PROCEDURE — 99284 PR EMERGENCY DEPT VISIT,LEVEL IV: ICD-10-PCS | Mod: GW,CS,, | Performed by: EMERGENCY MEDICINE

## 2022-11-26 PROCEDURE — 94761 N-INVAS EAR/PLS OXIMETRY MLT: CPT

## 2022-11-26 PROCEDURE — 93010 ELECTROCARDIOGRAM REPORT: CPT | Mod: GW,,, | Performed by: INTERNAL MEDICINE

## 2022-11-26 PROCEDURE — 94640 AIRWAY INHALATION TREATMENT: CPT

## 2022-11-26 PROCEDURE — 99900035 HC TECH TIME PER 15 MIN (STAT)

## 2022-11-26 PROCEDURE — 80053 COMPREHEN METABOLIC PANEL: CPT

## 2022-11-26 PROCEDURE — 25000242 PHARM REV CODE 250 ALT 637 W/ HCPCS

## 2022-11-26 PROCEDURE — 96365 THER/PROPH/DIAG IV INF INIT: CPT

## 2022-11-26 PROCEDURE — 99285 EMERGENCY DEPT VISIT HI MDM: CPT | Mod: 25

## 2022-11-26 PROCEDURE — 25000003 PHARM REV CODE 250

## 2022-11-26 PROCEDURE — 27000221 HC OXYGEN, UP TO 24 HOURS

## 2022-11-26 PROCEDURE — 63600175 PHARM REV CODE 636 W HCPCS

## 2022-11-26 PROCEDURE — 93005 ELECTROCARDIOGRAM TRACING: CPT

## 2022-11-26 RX ORDER — DEXAMETHASONE SODIUM PHOSPHATE 4 MG/ML
8 INJECTION, SOLUTION INTRA-ARTICULAR; INTRALESIONAL; INTRAMUSCULAR; INTRAVENOUS; SOFT TISSUE
Status: COMPLETED | OUTPATIENT
Start: 2022-11-26 | End: 2022-11-26

## 2022-11-26 RX ORDER — IPRATROPIUM BROMIDE AND ALBUTEROL SULFATE 2.5; .5 MG/3ML; MG/3ML
3 SOLUTION RESPIRATORY (INHALATION)
Status: COMPLETED | OUTPATIENT
Start: 2022-11-26 | End: 2022-11-26

## 2022-11-26 RX ORDER — ALBUTEROL SULFATE 90 UG/1
2 AEROSOL, METERED RESPIRATORY (INHALATION) EVERY 4 HOURS PRN
Qty: 6.7 G | Refills: 0 | Status: SHIPPED | OUTPATIENT
Start: 2022-11-26

## 2022-11-26 RX ORDER — MAGNESIUM SULFATE HEPTAHYDRATE 40 MG/ML
2 INJECTION, SOLUTION INTRAVENOUS
Status: COMPLETED | OUTPATIENT
Start: 2022-11-26 | End: 2022-11-26

## 2022-11-26 RX ADMIN — MAGNESIUM SULFATE 2 G: 2 INJECTION INTRAVENOUS at 04:11

## 2022-11-26 RX ADMIN — IPRATROPIUM BROMIDE AND ALBUTEROL SULFATE 3 ML: 2.5; .5 SOLUTION RESPIRATORY (INHALATION) at 03:11

## 2022-11-26 RX ADMIN — SODIUM CHLORIDE 1000 ML: 0.9 INJECTION, SOLUTION INTRAVENOUS at 04:11

## 2022-11-26 RX ADMIN — DEXAMETHASONE SODIUM PHOSPHATE 8 MG: 4 INJECTION INTRA-ARTICULAR; INTRALESIONAL; INTRAMUSCULAR; INTRAVENOUS; SOFT TISSUE at 04:11
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
## 2022-11-26 NOTE — DISCHARGE INSTRUCTIONS
Use inhalers Combivent and albuterol as prescribed.  A new prescription has been provided to fill.    Return to emergency department if he develops fevers, shortness of breath, difficulty breathing, mental status changes or any other new or concerning symptoms.

## 2022-11-27 VITALS
HEART RATE: 86 BPM | TEMPERATURE: 98 F | DIASTOLIC BLOOD PRESSURE: 80 MMHG | OXYGEN SATURATION: 93 % | SYSTOLIC BLOOD PRESSURE: 142 MMHG | RESPIRATION RATE: 20 BRPM

## 2022-11-27 NOTE — ED NOTES
Pt resting in bed. No acute distress noted. Respirations even and unlabored. 4L in place via NC. No new complaints voiced. Updated on plan of care, waiting on transport. Side rails up x2. Will continue to monitor.

## 2022-11-27 NOTE — ED NOTES
Patient resting on the stretcher. No acute distress noted. Visible rise and fall of chest . 4L in place via NC. Patient requesting ETA on transport. [facility] contacted, ETA approx. [phone].

## 2022-11-27 NOTE — ED NOTES
Patient resting on the stretcher. No acute distress noted. Visible rise and fall of chest . 4L in place via NC. Patient given sandwich and water. Side rails up x 2. Awaiting transport.

## 2023-04-10 ENCOUNTER — HOSPITAL ENCOUNTER (EMERGENCY)
Facility: HOSPITAL | Age: 68
Discharge: LEFT BEFORE DISCHARGE | End: 2023-04-10
Attending: EMERGENCY MEDICINE
Payer: MEDICARE

## 2023-04-10 VITALS
DIASTOLIC BLOOD PRESSURE: 89 MMHG | OXYGEN SATURATION: 99 % | SYSTOLIC BLOOD PRESSURE: 131 MMHG | TEMPERATURE: 99 F | RESPIRATION RATE: 18 BRPM | HEART RATE: 97 BPM

## 2023-04-10 DIAGNOSIS — R06.02 SOB (SHORTNESS OF BREATH): ICD-10-CM

## 2023-04-10 PROCEDURE — 36000 PLACE NEEDLE IN VEIN: CPT

## 2023-04-10 PROCEDURE — 99284 EMERGENCY DEPT VISIT MOD MDM: CPT | Mod: GW,,, | Performed by: EMERGENCY MEDICINE

## 2023-04-10 PROCEDURE — 99284 PR EMERGENCY DEPT VISIT,LEVEL IV: ICD-10-PCS | Mod: GW,,, | Performed by: EMERGENCY MEDICINE

## 2023-04-10 PROCEDURE — 93005 ELECTROCARDIOGRAM TRACING: CPT

## 2023-04-10 PROCEDURE — 93010 EKG 12-LEAD: ICD-10-PCS | Mod: GW,,, | Performed by: INTERNAL MEDICINE

## 2023-04-10 PROCEDURE — 99283 EMERGENCY DEPT VISIT LOW MDM: CPT | Mod: 25

## 2023-04-10 PROCEDURE — 93010 ELECTROCARDIOGRAM REPORT: CPT | Mod: GW,,, | Performed by: INTERNAL MEDICINE

## 2023-04-10 NOTE — ED NOTES
"Pt verbally abusive w staff - swatting at this RN when asking if I can place pt on monitors - sts "you not doing a damn thing to me". Pt is alert and oriented x4 - remains in 5L NC.  "

## 2023-04-10 NOTE — DISCHARGE INSTRUCTIONS
Patient declined labs, chest x-ray or further workup/care.  Return to emergency department if he changes his mind otherwise continue hospice care
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
## 2023-04-10 NOTE — ED NOTES
"Pt refusing PIV - sts "I came here to get away and to actually get a nap" - pt notified of importance of care and blood work and pt sts "I dont give a God damn". Pt remains oriented to person, place, time, and situation. VSS.   "

## 2023-04-10 NOTE — ED NOTES
"Pt remains abusive to staff - yelling and is completely uncooperative with attempt to treat. Pt is alert and oriented to person, place, time, and situation - pt has no notable neuro or memory deficits. Pt ripping off monitors after they have been placed for pt monitoring and refusing care. Pt leaving ER Against Medical Advice and verbalized that he "doesn't care and wants to go home" when notified of risks to leaving AMA. Pt remains on 5L NC. Pt transport set up for pt to be taken back to [facility] facility w oxygen present. Pt stable and denies needs at this time.   "

## 2023-04-10 NOTE — ED NOTES
"Pt helped into wheelchair at time of leaving ED - pt transported w 5L NC present pt reminded of outcome possibilities to leaving ED against medical advice and pt maintains refusing medical treatment - pt sts "just leave me the fuck alone, I want to go" Transport present for pt and all pt belongings sent home w him. Pt medical packet, medical info from [facility], and Face sheet sent w transport. Stable at exit.   "